# Patient Record
Sex: FEMALE | Race: WHITE | NOT HISPANIC OR LATINO | URBAN - METROPOLITAN AREA
[De-identification: names, ages, dates, MRNs, and addresses within clinical notes are randomized per-mention and may not be internally consistent; named-entity substitution may affect disease eponyms.]

---

## 2017-03-19 ENCOUNTER — OFFICE VISIT - HEALTHEAST (OUTPATIENT)
Dept: FAMILY MEDICINE | Facility: CLINIC | Age: 32
End: 2017-03-19

## 2017-03-19 DIAGNOSIS — J32.9 SINUSITIS: ICD-10-CM

## 2017-03-19 DIAGNOSIS — J02.0 STREP PHARYNGITIS: ICD-10-CM

## 2017-03-19 DIAGNOSIS — R07.0 THROAT PAIN: ICD-10-CM

## 2017-06-08 ENCOUNTER — RECORDS - HEALTHEAST (OUTPATIENT)
Dept: ADMINISTRATIVE | Facility: OTHER | Age: 32
End: 2017-06-08

## 2017-06-08 LAB
BKR LAB AP ABNORMAL BLEEDING: NO
BKR LAB AP BIRTH CONTROL/HORMONES: NORMAL
BKR LAB AP CERVICAL APPEARANCE: NORMAL
BKR LAB AP GYN ADEQUACY: NORMAL
BKR LAB AP GYN INTERPRETATION: NORMAL
BKR LAB AP HPV REFLEX: NORMAL
BKR LAB AP LMP: NORMAL
BKR LAB AP PATIENT STATUS: NORMAL
BKR LAB AP PREVIOUS ABNORMAL: 2009
BKR LAB AP PREVIOUS NORMAL: NORMAL
HIGH RISK?: NO
HPV INTERPRETATION - HISTORICAL: NORMAL
HPV INTERPRETER - HISTORICAL: NORMAL
PATH REPORT.COMMENTS IMP SPEC: NORMAL
RESULT FLAG (HE HISTORICAL CONVERSION): NORMAL

## 2018-07-30 ENCOUNTER — PRENATAL OFFICE VISIT - HEALTHEAST (OUTPATIENT)
Dept: MIDWIFE SERVICES | Facility: CLINIC | Age: 33
End: 2018-07-30

## 2018-07-30 DIAGNOSIS — O09.91 SUPERVISION OF HIGH RISK PREGNANCY IN FIRST TRIMESTER: ICD-10-CM

## 2018-07-30 DIAGNOSIS — Z87.59 HISTORY OF POSTPARTUM HEMORRHAGE: ICD-10-CM

## 2018-07-30 DIAGNOSIS — O34.219 PREVIOUS CESAREAN DELIVERY, ANTEPARTUM CONDITION OR COMPLICATION: ICD-10-CM

## 2018-07-30 LAB
BASOPHILS # BLD AUTO: 0 THOU/UL (ref 0–0.2)
BASOPHILS NFR BLD AUTO: 1 % (ref 0–2)
EOSINOPHIL # BLD AUTO: 0.1 THOU/UL (ref 0–0.4)
EOSINOPHIL NFR BLD AUTO: 1 % (ref 0–6)
ERYTHROCYTE [DISTWIDTH] IN BLOOD BY AUTOMATED COUNT: 12.7 % (ref 11–14.5)
HCT VFR BLD AUTO: 39.8 % (ref 35–47)
HGB BLD-MCNC: 13.6 G/DL (ref 12–16)
HIV 1+2 AB+HIV1 P24 AG SERPL QL IA: NEGATIVE
LYMPHOCYTES # BLD AUTO: 1.6 THOU/UL (ref 0.8–4.4)
LYMPHOCYTES NFR BLD AUTO: 25 % (ref 20–40)
MCH RBC QN AUTO: 29.5 PG (ref 27–34)
MCHC RBC AUTO-ENTMCNC: 34.2 G/DL (ref 32–36)
MCV RBC AUTO: 86 FL (ref 80–100)
MONOCYTES # BLD AUTO: 0.4 THOU/UL (ref 0–0.9)
MONOCYTES NFR BLD AUTO: 6 % (ref 2–10)
NEUTROPHILS # BLD AUTO: 4.4 THOU/UL (ref 2–7.7)
NEUTROPHILS NFR BLD AUTO: 67 % (ref 50–70)
PLATELET # BLD AUTO: 195 THOU/UL (ref 140–440)
PMV BLD AUTO: 11.1 FL (ref 8.5–12.5)
RBC # BLD AUTO: 4.61 MILL/UL (ref 3.8–5.4)
WBC: 6.5 THOU/UL (ref 4–11)

## 2018-07-30 ASSESSMENT — MIFFLIN-ST. JEOR: SCORE: 1413.69

## 2018-07-31 LAB
ABO/RH(D): NORMAL
ABORH REPEAT: NORMAL
ANTIBODY SCREEN: NEGATIVE
BACTERIA SPEC CULT: NO GROWTH
HBV SURFACE AG SERPL QL IA: NEGATIVE
RUBV IGG SERPL QL IA: POSITIVE
T PALLIDUM AB SER QL: NEGATIVE

## 2018-08-27 ENCOUNTER — PRENATAL OFFICE VISIT - HEALTHEAST (OUTPATIENT)
Dept: MIDWIFE SERVICES | Facility: CLINIC | Age: 33
End: 2018-08-27

## 2018-08-27 DIAGNOSIS — O09.91 SUPERVISION OF HIGH RISK PREGNANCY IN FIRST TRIMESTER: ICD-10-CM

## 2018-08-27 ASSESSMENT — MIFFLIN-ST. JEOR: SCORE: 1418.23

## 2018-09-24 ENCOUNTER — PRENATAL OFFICE VISIT - HEALTHEAST (OUTPATIENT)
Dept: MIDWIFE SERVICES | Facility: CLINIC | Age: 33
End: 2018-09-24

## 2018-09-24 DIAGNOSIS — O09.92 SUPERVISION OF HIGH RISK PREGNANCY IN SECOND TRIMESTER: ICD-10-CM

## 2018-09-24 DIAGNOSIS — O34.219 PREVIOUS CESAREAN DELIVERY, ANTEPARTUM CONDITION OR COMPLICATION: ICD-10-CM

## 2018-09-24 ASSESSMENT — MIFFLIN-ST. JEOR: SCORE: 1428.21

## 2018-10-15 ENCOUNTER — HOSPITAL ENCOUNTER (OUTPATIENT)
Dept: ULTRASOUND IMAGING | Facility: CLINIC | Age: 33
Discharge: HOME | End: 2018-10-15
Attending: MIDWIFE

## 2018-10-15 ENCOUNTER — AMBULATORY - HEALTHEAST (OUTPATIENT)
Dept: MIDWIFE SERVICES | Facility: CLINIC | Age: 33
End: 2018-10-15

## 2018-10-15 DIAGNOSIS — O09.92 SUPERVISION OF HIGH RISK PREGNANCY IN SECOND TRIMESTER: ICD-10-CM

## 2018-10-18 ENCOUNTER — COMMUNICATION - HEALTHEAST (OUTPATIENT)
Dept: ADMINISTRATIVE | Facility: CLINIC | Age: 33
End: 2018-10-18

## 2018-10-18 DIAGNOSIS — O28.3 ECHOGENIC INTRACARDIAC FOCUS OF FETUS ON PRENATAL ULTRASOUND: ICD-10-CM

## 2018-10-22 ENCOUNTER — PRENATAL OFFICE VISIT - HEALTHEAST (OUTPATIENT)
Dept: MIDWIFE SERVICES | Facility: CLINIC | Age: 33
End: 2018-10-22

## 2018-10-22 DIAGNOSIS — O09.92 SUPERVISION OF HIGH RISK PREGNANCY IN SECOND TRIMESTER: ICD-10-CM

## 2018-10-22 DIAGNOSIS — O34.219 PREVIOUS CESAREAN DELIVERY, ANTEPARTUM CONDITION OR COMPLICATION: ICD-10-CM

## 2018-10-22 ASSESSMENT — MIFFLIN-ST. JEOR: SCORE: 1461.77

## 2018-12-05 ENCOUNTER — PRENATAL OFFICE VISIT - HEALTHEAST (OUTPATIENT)
Dept: MIDWIFE SERVICES | Facility: CLINIC | Age: 33
End: 2018-12-05

## 2018-12-05 ENCOUNTER — COMMUNICATION - HEALTHEAST (OUTPATIENT)
Dept: MIDWIFE SERVICES | Facility: CLINIC | Age: 33
End: 2018-12-05

## 2018-12-05 DIAGNOSIS — O34.219 PREVIOUS CESAREAN DELIVERY, ANTEPARTUM CONDITION OR COMPLICATION: ICD-10-CM

## 2018-12-05 DIAGNOSIS — O09.629 SUPERVISION OF HIGH-RISK PREGNANCY OF YOUNG MULTIGRAVIDA: ICD-10-CM

## 2018-12-05 LAB
FASTING STATUS PATIENT QL REPORTED: NO
GLUCOSE 1H P 50 G GLC PO SERPL-MCNC: 112 MG/DL (ref 70–139)
HGB BLD-MCNC: 11.3 G/DL (ref 12–16)

## 2018-12-05 ASSESSMENT — MIFFLIN-ST. JEOR: SCORE: 1491.26

## 2018-12-06 LAB — T PALLIDUM AB SER QL: NEGATIVE

## 2018-12-13 ENCOUNTER — OFFICE VISIT - HEALTHEAST (OUTPATIENT)
Dept: OBGYN | Facility: CLINIC | Age: 33
End: 2018-12-13

## 2018-12-13 DIAGNOSIS — O34.219 H/O CESAREAN SECTION COMPLICATING PREGNANCY: ICD-10-CM

## 2018-12-13 ASSESSMENT — MIFFLIN-ST. JEOR: SCORE: 1490.8

## 2019-01-02 ENCOUNTER — PRENATAL OFFICE VISIT - HEALTHEAST (OUTPATIENT)
Dept: MIDWIFE SERVICES | Facility: CLINIC | Age: 34
End: 2019-01-02

## 2019-01-02 DIAGNOSIS — Z87.59 HISTORY OF POSTPARTUM HEMORRHAGE: ICD-10-CM

## 2019-01-02 DIAGNOSIS — O34.219 PREVIOUS CESAREAN DELIVERY, ANTEPARTUM CONDITION OR COMPLICATION: ICD-10-CM

## 2019-01-02 DIAGNOSIS — O09.92 SUPERVISION OF HIGH RISK PREGNANCY IN SECOND TRIMESTER: ICD-10-CM

## 2019-01-02 ASSESSMENT — MIFFLIN-ST. JEOR: SCORE: 1522.1

## 2019-01-16 ENCOUNTER — PRENATAL OFFICE VISIT - HEALTHEAST (OUTPATIENT)
Dept: MIDWIFE SERVICES | Facility: CLINIC | Age: 34
End: 2019-01-16

## 2019-01-16 DIAGNOSIS — O34.219 PREVIOUS CESAREAN DELIVERY, ANTEPARTUM CONDITION OR COMPLICATION: ICD-10-CM

## 2019-01-16 DIAGNOSIS — O09.92 SUPERVISION OF HIGH RISK PREGNANCY IN SECOND TRIMESTER: ICD-10-CM

## 2019-01-16 ASSESSMENT — MIFFLIN-ST. JEOR: SCORE: 1513.48

## 2019-01-31 ENCOUNTER — PRENATAL OFFICE VISIT - HEALTHEAST (OUTPATIENT)
Dept: MIDWIFE SERVICES | Facility: CLINIC | Age: 34
End: 2019-01-31

## 2019-01-31 DIAGNOSIS — O09.92 SUPERVISION OF HIGH RISK PREGNANCY IN SECOND TRIMESTER: ICD-10-CM

## 2019-01-31 LAB — HGB BLD-MCNC: 12.5 G/DL (ref 12–16)

## 2019-01-31 ASSESSMENT — MIFFLIN-ST. JEOR: SCORE: 1547.96

## 2019-02-01 LAB
ALLERGIC TO PENICILLIN: NO
GP B STREP DNA SPEC QL NAA+PROBE: NEGATIVE

## 2019-02-06 ENCOUNTER — PRENATAL OFFICE VISIT - HEALTHEAST (OUTPATIENT)
Dept: MIDWIFE SERVICES | Facility: CLINIC | Age: 34
End: 2019-02-06

## 2019-02-06 DIAGNOSIS — O09.92 SUPERVISION OF HIGH RISK PREGNANCY IN SECOND TRIMESTER: ICD-10-CM

## 2019-02-06 DIAGNOSIS — O34.219 PREVIOUS CESAREAN DELIVERY, ANTEPARTUM CONDITION OR COMPLICATION: ICD-10-CM

## 2019-02-06 ASSESSMENT — MIFFLIN-ST. JEOR: SCORE: 1542.06

## 2019-02-13 ENCOUNTER — COMMUNICATION - HEALTHEAST (OUTPATIENT)
Dept: MIDWIFE SERVICES | Facility: CLINIC | Age: 34
End: 2019-02-13

## 2019-02-13 ENCOUNTER — PRENATAL OFFICE VISIT - HEALTHEAST (OUTPATIENT)
Dept: MIDWIFE SERVICES | Facility: CLINIC | Age: 34
End: 2019-02-13

## 2019-02-13 DIAGNOSIS — O32.2XX0 TRANSVERSE LIE OF FETUS, SINGLE OR UNSPECIFIED FETUS: ICD-10-CM

## 2019-02-13 ASSESSMENT — MIFFLIN-ST. JEOR: SCORE: 1541.15

## 2019-02-18 ENCOUNTER — HOSPITAL ENCOUNTER (OUTPATIENT)
Dept: ULTRASOUND IMAGING | Facility: CLINIC | Age: 34
Discharge: HOME | End: 2019-02-18
Attending: ADVANCED PRACTICE MIDWIFE

## 2019-02-18 ENCOUNTER — AMBULATORY - HEALTHEAST (OUTPATIENT)
Dept: OBGYN | Facility: CLINIC | Age: 34
End: 2019-02-18

## 2019-02-18 DIAGNOSIS — O32.2XX0 TRANSVERSE LIE OF FETUS, SINGLE OR UNSPECIFIED FETUS: ICD-10-CM

## 2019-02-20 ENCOUNTER — PRENATAL OFFICE VISIT - HEALTHEAST (OUTPATIENT)
Dept: MIDWIFE SERVICES | Facility: CLINIC | Age: 34
End: 2019-02-20

## 2019-02-20 ENCOUNTER — COMMUNICATION - HEALTHEAST (OUTPATIENT)
Dept: MIDWIFE SERVICES | Facility: CLINIC | Age: 34
End: 2019-02-20

## 2019-02-20 DIAGNOSIS — O09.92 SUPERVISION OF HIGH RISK PREGNANCY IN SECOND TRIMESTER: ICD-10-CM

## 2019-02-20 DIAGNOSIS — O34.219 PREVIOUS CESAREAN DELIVERY, ANTEPARTUM CONDITION OR COMPLICATION: ICD-10-CM

## 2019-02-20 ASSESSMENT — MIFFLIN-ST. JEOR: SCORE: 1555.22

## 2019-02-25 ENCOUNTER — SURGERY - HEALTHEAST (OUTPATIENT)
Dept: OBGYN | Facility: CLINIC | Age: 34
End: 2019-02-25

## 2019-02-25 ENCOUNTER — ANESTHESIA - HEALTHEAST (OUTPATIENT)
Dept: OBGYN | Facility: CLINIC | Age: 34
End: 2019-02-25

## 2019-02-25 ENCOUNTER — COMMUNICATION - HEALTHEAST (OUTPATIENT)
Dept: MIDWIFE SERVICES | Facility: CLINIC | Age: 34
End: 2019-02-25

## 2019-02-25 ASSESSMENT — MIFFLIN-ST. JEOR: SCORE: 1554.31

## 2019-02-28 ENCOUNTER — HOME CARE/HOSPICE - HEALTHEAST (OUTPATIENT)
Dept: HOME HEALTH SERVICES | Facility: HOME HEALTH | Age: 34
End: 2019-02-28

## 2019-03-01 ENCOUNTER — HOME CARE/HOSPICE - HEALTHEAST (OUTPATIENT)
Dept: HOME HEALTH SERVICES | Facility: HOME HEALTH | Age: 34
End: 2019-03-01

## 2019-04-04 ENCOUNTER — OFFICE VISIT - HEALTHEAST (OUTPATIENT)
Dept: MIDWIFE SERVICES | Facility: CLINIC | Age: 34
End: 2019-04-04

## 2019-04-04 DIAGNOSIS — Z87.59 HISTORY OF POSTPARTUM HEMORRHAGE: ICD-10-CM

## 2019-04-04 DIAGNOSIS — Z98.891 STATUS POST REPEAT LOW TRANSVERSE CESAREAN SECTION: ICD-10-CM

## 2019-04-04 DIAGNOSIS — N81.89 PELVIC FLOOR RELAXATION: ICD-10-CM

## 2019-04-04 LAB — HGB BLD-MCNC: 12.9 G/DL (ref 12–16)

## 2019-04-04 ASSESSMENT — MIFFLIN-ST. JEOR: SCORE: 1431.84

## 2019-04-05 LAB
25(OH)D3 SERPL-MCNC: 60.1 NG/ML (ref 30–80)
25(OH)D3 SERPL-MCNC: 60.1 NG/ML (ref 30–80)

## 2019-04-11 ENCOUNTER — AMBULATORY - HEALTHEAST (OUTPATIENT)
Dept: MIDWIFE SERVICES | Facility: CLINIC | Age: 34
End: 2019-04-11

## 2019-04-15 ENCOUNTER — COMMUNICATION - HEALTHEAST (OUTPATIENT)
Dept: MIDWIFE SERVICES | Facility: CLINIC | Age: 34
End: 2019-04-15

## 2019-04-17 ENCOUNTER — OFFICE VISIT - HEALTHEAST (OUTPATIENT)
Dept: PHYSICAL THERAPY | Facility: REHABILITATION | Age: 34
End: 2019-04-17

## 2019-04-17 DIAGNOSIS — R39.15 URINARY URGENCY: ICD-10-CM

## 2019-04-17 DIAGNOSIS — N81.89 PELVIC FLOOR WEAKNESS: ICD-10-CM

## 2019-04-17 DIAGNOSIS — N81.89 PELVIC FLOOR RELAXATION: ICD-10-CM

## 2019-05-10 ENCOUNTER — OFFICE VISIT - HEALTHEAST (OUTPATIENT)
Dept: PHYSICAL THERAPY | Facility: REHABILITATION | Age: 34
End: 2019-05-10

## 2019-05-10 DIAGNOSIS — N81.89 PELVIC FLOOR WEAKNESS: ICD-10-CM

## 2019-05-10 DIAGNOSIS — R39.15 URINARY URGENCY: ICD-10-CM

## 2019-05-10 DIAGNOSIS — N81.89 PELVIC FLOOR RELAXATION: ICD-10-CM

## 2020-01-10 ENCOUNTER — COMMUNICATION - HEALTHEAST (OUTPATIENT)
Dept: SCHEDULING | Facility: CLINIC | Age: 35
End: 2020-01-10

## 2020-01-11 ENCOUNTER — OFFICE VISIT - HEALTHEAST (OUTPATIENT)
Dept: FAMILY MEDICINE | Facility: CLINIC | Age: 35
End: 2020-01-11

## 2020-01-11 DIAGNOSIS — R11.0 NAUSEA: ICD-10-CM

## 2020-01-11 DIAGNOSIS — K62.5 RECTAL BLEEDING: ICD-10-CM

## 2020-01-11 DIAGNOSIS — R00.1 BRADYCARDIA: ICD-10-CM

## 2020-01-11 DIAGNOSIS — R42 DIZZINESS: ICD-10-CM

## 2020-01-11 DIAGNOSIS — Z71.1: ICD-10-CM

## 2020-01-11 LAB
ALBUMIN UR-MCNC: NEGATIVE MG/DL
ANION GAP SERPL CALCULATED.3IONS-SCNC: 8 MMOL/L (ref 5–18)
APPEARANCE UR: CLEAR
ATRIAL RATE - MUSE: 52 BPM
BASOPHILS # BLD AUTO: 0 THOU/UL (ref 0–0.2)
BASOPHILS NFR BLD AUTO: 0 % (ref 0–2)
BILIRUB UR QL STRIP: NEGATIVE
BUN SERPL-MCNC: 21 MG/DL (ref 8–22)
CHLORIDE BLD-SCNC: 103 MMOL/L (ref 98–107)
CO2 SERPL-SCNC: 26 MMOL/L (ref 22–31)
COLOR UR AUTO: YELLOW
CREAT SERPL-MCNC: 0.6 MG/DL (ref 0.7–1.3)
DEPRECATED S PYO AG THROAT QL EIA: NORMAL
DIASTOLIC BLOOD PRESSURE - MUSE: NORMAL
EOSINOPHIL # BLD AUTO: 0.1 THOU/UL (ref 0–0.4)
EOSINOPHIL NFR BLD AUTO: 2 % (ref 0–6)
ERYTHROCYTE [DISTWIDTH] IN BLOOD BY AUTOMATED COUNT: 11.8 % (ref 11–14.5)
FLUAV AG SPEC QL IA: NORMAL
FLUBV AG SPEC QL IA: NORMAL
GLUCOSE BLD-MCNC: 96 MG/DL (ref 70–125)
GLUCOSE UR STRIP-MCNC: NEGATIVE MG/DL
HCG UR QL: NEGATIVE
HCT VFR BLD AUTO: 36.9 % (ref 35–47)
HEMOCCULT SP1 STL QL: POSITIVE
HGB BLD-MCNC: 12 G/DL (ref 12–16)
HGB UR QL STRIP: NEGATIVE
INTERPRETATION ECG - MUSE: NORMAL
KETONES UR STRIP-MCNC: NEGATIVE MG/DL
LEUKOCYTE ESTERASE UR QL STRIP: NEGATIVE
LYMPHOCYTES # BLD AUTO: 1.7 THOU/UL (ref 0.8–4.4)
LYMPHOCYTES NFR BLD AUTO: 33 % (ref 20–40)
MCH RBC QN AUTO: 28.8 PG (ref 27–34)
MCHC RBC AUTO-ENTMCNC: 32.6 G/DL (ref 32–36)
MCV RBC AUTO: 88 FL (ref 80–100)
MONOCYTES # BLD AUTO: 0.3 THOU/UL (ref 0–0.9)
MONOCYTES NFR BLD AUTO: 7 % (ref 2–10)
NEUTROPHILS # BLD AUTO: 3 THOU/UL (ref 2–7.7)
NEUTROPHILS NFR BLD AUTO: 58 % (ref 50–70)
NITRATE UR QL: NEGATIVE
P AXIS - MUSE: 41 DEGREES
PH UR STRIP: 6.5 [PH] (ref 5–8)
PLATELET # BLD AUTO: 204 THOU/UL (ref 140–440)
PMV BLD AUTO: 7.6 FL (ref 7–10)
POTASSIUM BLD-SCNC: 4.2 MMOL/L (ref 3.5–5.5)
PR INTERVAL - MUSE: 164 MS
QRS DURATION - MUSE: 92 MS
QT - MUSE: 416 MS
QTC - MUSE: 386 MS
R AXIS - MUSE: 75 DEGREES
RBC # BLD AUTO: 4.18 MILL/UL (ref 3.8–5.4)
SODIUM SERPL-SCNC: 137 MMOL/L (ref 136–145)
SP GR UR STRIP: 1.02 (ref 1–1.03)
SYSTOLIC BLOOD PRESSURE - MUSE: NORMAL
T AXIS - MUSE: 19 DEGREES
UROBILINOGEN UR STRIP-ACNC: NORMAL
VENTRICULAR RATE- MUSE: 52 BPM
WBC: 5.1 THOU/UL (ref 4–11)

## 2020-01-12 LAB — GROUP A STREP BY PCR: NORMAL

## 2020-01-13 ENCOUNTER — OFFICE VISIT - HEALTHEAST (OUTPATIENT)
Dept: FAMILY MEDICINE | Facility: CLINIC | Age: 35
End: 2020-01-13

## 2020-01-13 DIAGNOSIS — L65.9 HAIR LOSS: ICD-10-CM

## 2020-01-13 DIAGNOSIS — F41.1 GENERALIZED ANXIETY DISORDER: ICD-10-CM

## 2020-01-13 DIAGNOSIS — G43.709 CHRONIC MIGRAINE WITHOUT AURA WITHOUT STATUS MIGRAINOSUS, NOT INTRACTABLE: ICD-10-CM

## 2020-01-13 DIAGNOSIS — Z13.220 SCREENING, LIPID: ICD-10-CM

## 2020-01-13 LAB
ALBUMIN SERPL-MCNC: 4.4 G/DL (ref 3.5–5)
ALP SERPL-CCNC: 54 U/L (ref 45–120)
ALT SERPL W P-5'-P-CCNC: 14 U/L (ref 0–45)
ANION GAP SERPL CALCULATED.3IONS-SCNC: 7 MMOL/L (ref 5–18)
AST SERPL W P-5'-P-CCNC: 19 U/L (ref 0–40)
BILIRUB SERPL-MCNC: 0.7 MG/DL (ref 0–1)
BUN SERPL-MCNC: 13 MG/DL (ref 8–22)
CALCIUM SERPL-MCNC: 9.8 MG/DL (ref 8.5–10.5)
CHLORIDE BLD-SCNC: 104 MMOL/L (ref 98–107)
CHOLEST SERPL-MCNC: 219 MG/DL
CO2 SERPL-SCNC: 29 MMOL/L (ref 22–31)
CREAT SERPL-MCNC: 0.79 MG/DL (ref 0.6–1.1)
FASTING STATUS PATIENT QL REPORTED: YES
GFR SERPL CREATININE-BSD FRML MDRD: >60 ML/MIN/1.73M2
GLUCOSE BLD-MCNC: 86 MG/DL (ref 70–125)
HDLC SERPL-MCNC: 73 MG/DL
LDLC SERPL CALC-MCNC: 135 MG/DL
POTASSIUM BLD-SCNC: 4.1 MMOL/L (ref 3.5–5)
PROT SERPL-MCNC: 7.4 G/DL (ref 6–8)
SODIUM SERPL-SCNC: 140 MMOL/L (ref 136–145)
TRIGL SERPL-MCNC: 57 MG/DL
TSH SERPL DL<=0.005 MIU/L-ACNC: 1.18 UIU/ML (ref 0.3–5)

## 2020-01-13 ASSESSMENT — MIFFLIN-ST. JEOR: SCORE: 1430.48

## 2020-01-14 ENCOUNTER — COMMUNICATION - HEALTHEAST (OUTPATIENT)
Dept: FAMILY MEDICINE | Facility: CLINIC | Age: 35
End: 2020-01-14

## 2021-05-27 NOTE — PROGRESS NOTES
THELMA Antunez,KIERRA performed first assist services for Christen's  section 19 with Dr. Oconnell.

## 2021-05-27 NOTE — PROGRESS NOTES
[facility]   Initial Evaluation    Patient Name: Christen Chappell  Date of evaluation: 2019  Referral Diagnosis: pelvic floor prolapse and urinary urgency  Referring provider: Yoly Aguilera*  Visit Diagnosis:     ICD-10-CM    1. Pelvic floor relaxation N81.89    2. Urinary urgency R39.15    3. Pelvic floor weakness N81.89        Assessment:      Pt. is appropriate for skilled PT intervention as outlined in the Plan of Care (POC).  Pt. is a good candidate for skilled PT services to improve pain levels and function.    Goals:  Pt. will demonstrate/verbalize independence in self-management of condition in : 4 weeks    Pt will: no longer experience urinary urgency in 4 weeks  Pt will: be able to perform abdominal strengthening exercises without experiencing a feeling of prolapse in 4 weeks      Patient's expectations/goals are realistic    Barriers to Learning or Achieving Goals:  No Barriers.    Goals and plan of care were set up in collaboration with the patient.       Plan / Patient Instructions:        Plan of Care:   Communication with: Referral Source  Patient Related Instruction: Nature of Condition;Precautions;Next steps;Treatment plan and rationale;Expected outcome;Self Care instruction;Basis of treatment  Times per Week: 1  Number of Weeks: 2-4  Number of Visits: 4  Discharge Planning: to include self management  Precautions / Restrictions : none  Therapeutic Exercise: ROM;Stretching;Strengthening;Pelvic Floor retraining  Manual Therapy: soft tissue mobilization;myofascial release      Plan for next visit: continue with abdominal strengthening and pelvic floor strengthening.     Subjective:         History of Present Illness:    hCristen is a 33 y.o. female who presents to therapy today with complaints of prolapse.   Date of onset/duration of symptoms is 2019 after having her third child/second .   Did have some incontinence 2 weeks after her  but that has sense  resolved.  Denies having any stress incontinence.  Describes having urinary urgency.   Occurs daily.  Doesn't have any leakage with this.   Is not aware of any symptoms of her prolapse but does describe a feeling of movement in the pelvis when coming to a sitting position from lying down.   She describes previous level of function as not limited   Going back to work May 13th.       Pain Ratin}    Functional limitations are described as occurring with:   urinary urgency and feelings of prolapse    Patient presents with pelvic floor weakness, prolapse, and symptoms of urinary urgency.  Would benefit from PT to increase strength and manage urinary urgency.     Objective:        Examination    Patient was educated on what the exam today would consist of.  Consent was obtained for performing an external and internal vaginal exam.    1. Pelvic floor relaxation     2. Urinary urgency     3. Pelvic floor weakness       Precautions/Restrictions: None  Involved side: Bilateral  Posture Observation:      General sitting posture is  normal.  General standing posture is normal.    Observation:     Breathing -  Chest breather.  Able to correct with cues   Vaginal observation:     Kegel - slight movement   Bearing down - no movement observed   Cough - bulging out observed         Palpation:   External - Not done    Pubic symphasis     Perineal body    Superficial transverse perineal muscle     Pubococcygeus    Iliococcygeus    Coccyx     Bulbocavernosus    Ischiocavernosus     Internal    Kegel 1st layer - minimal contraction    Kegel 2nd layer - moderate contraction    Kegel 3rd layer - moderate contraction      PERF test:        P - 2    E - 4    R - 2    F - 7      Obturator Internus - not palpated      Anterior Wall prolapse - stage 1-2 noted    Posterior Wall prolapse          Treatment Today     TREATMENT MINUTES COMMENTS   Evaluation 30 Low complexity   Self-care/ Home management     Manual therapy     Neuromuscular  Re-education     Therapeutic Activity     Therapeutic Exercises 25 Exercises:  Exercise #1: diaphragmatice breathing to be used during urgency episode  Exercise #2: Kegel 3 quick contactions and 3 gradual contractions 3-5 times 3-5 tiems a day    Discussed the mechanics of urinary urgency and ways to use Kegels and diaphragmatic breathing to manage.     Gait training     Modality__________________                Total 55    Blank areas are intentional and mean the treatment did not include these items.     PT Evaluation Code: (Please list factors)  Patient History/Comorbidities: none  Examination: pelvic floor weakness, pelvic floor prolapse, urinary urgency  Clinical Presentation: stable  Clinical Decision Making: low    Patient History/  Comorbidities Examination  (body structures and functions, activity limitations, and/or participation restrictions) Clinical Presentation Clinical Decision Making (Complexity)   No documented Comorbidities or personal factors 1-2 Elements Stable and/or uncomplicated Low   1-2 documented comorbidities or personal factor 3 Elements Evolving clinical presentation with changing characteristics Moderate   3-4 documented comorbidities or personal factors 4 or more Unstable and unpredictable High              Loraine Barber, PT  4/17/2019  2:04 PM

## 2021-05-27 NOTE — PATIENT INSTRUCTIONS - HE
For constipation try Magnesium Oxide 350mg daily, probiotics (with bifudus), increase intake of fibrous foods (you can try smoothies or fiber supplements), water intake and exercise (try to exercise more days out of the week than not).   If you haven't had a bowel movement in 3 days you can use Milk of Magnesia, Doculax, or Lactulose sparingly

## 2021-05-27 NOTE — PROGRESS NOTES
"6-week Postpartum Visit:     Assessment:   32yo  at 6 weeks postpartum   Repeat  29 for transverse fetal lie  Breastfeeding mother  Contraceptive Counseling  EDPS = 5, \"Never\" to #10  Last pap = 17 Neg, NIL, due 2022   Poor pelvic floor tone    Plan:   1. Adjustment to parenting, self care and importance of a support system discussed. Postpartum education given including: postpartum mood changes and postpartum depression. [place]  Mental Health Resource Card given for future reference prn.   2. Return of fertility discussed. Plans NFP (withdrawal and condoms as well) for contraception. Education given on this method. Resumption of intercourse reviewed with possible changes in libido and vaginal lubrication while nursing.  -Strongly encouraged Emily to consider a more effective form of birth control considering her recent  section encouraged her to use a highly effective method until her baby is at least 18 months old  3. Discussed resumption of exercise and normal timing of return to pre-pregnancy weight. Postpartum physical activity reviewed and encouraged modified abdominal crunches and Kegels daily. Encouraged integrating exercise, such as walking 20-30mns daily.   4.  Nutrition and supplements reviewed.  Advised continuation of a prenatal or multivitamin, also Vitamin D3 2000 IU geltab daily and an omega 3 fatty acid supplement.  5. Reviewed warning signs of pelvic pain, excessive bleeding or abdominal pain, fever/chills, or signs of breast infection.   6. Breastfeeding support resource list and contact info given, including [facility], [facility], local LLL groups, and new moms groups. Warning signs of breast infections (mastitis and thrush) reviewed.  7. Poor pelvic floor tone: referred to [facility] Amaro for evaluation and treatment.  If she is not in network Christen will choose another pelvic floor physical therapist  8. " "Offered emotional support around her disappointment that she will not be able to have another vaginal birth and would need to deliver early to avoid labor on her uterus.    -Labs today:     Hgb d/t hx of postpartum hemorrhage  Vitamin D.  Christen is currently taking 4000 IUs of vitamin D daily    -RTC for routine health maintenance or sooner as needed.     TT with patient 40 mns, >50% time spent in counseling or coordination of care.     Subjective:    Christen Chappell is a 33 y.o. female who presents for postpartum visit. She is 6 weeks postpartum following a repeat LTCS for transverse lie.  I have fully reviewed the prenatal and intrapartum course. The delivery was at Term and 39/4 weeks gestation Her baby girl is named Rosangela and weighed 8 lbs 2.9 oz at birth. Reflections on her birth include \"it was worse than natural labor\" She struggled during the  section with nausea, pain underneath her right ribs and anxiety.  She keeps asking herself if there is something she could have done to help her baby line up better before labor.   Christen gave birth to her son Rod by   due to fetal distress. She had a  of [place]        Postpartum course has been complicated by difficulty with getting enough rest, needing to ask for a lot of help. Baby's course has been stable. Baby is breastfeeding on demand. Lochia ceased at 4 weeks postpartum.  Bowel function is normal. Bladder function is normal. Hemorrhoids have improved. She has not resumed intercourse. Desired contraception: NFP, withdrawal and condoms during the fertile periods. Appetite is normal. Reports sleeping 6-7. Bruce  Depression Scale postpartum depression screening score: 5.   Rosangela has hip dysplasia, going to [facility] for care.  She has not resumed regular exercise. Patient returns to work in 4 weeks. Full time as an   Saw Dr. Spears at two weeks who advised that she not have a vaginal birth again or labor " "on her uterus.  If she decides to have another child Dr. Oconnell recommended a  section at 36-37 weeks due to her vertical incision.  Taking one iron supplement, will repeat hemoglobin today      Review of Systems  -A 12 point comprehensive review of systems was negative except as noted above.  -Prenatal history and intrapartum course were also reviewed today.  -Social, Medical and family history reviewed    Objective:      Vitals:    19 1054   BP: 100/66   Pulse: 72   Weight: 157 lb (71.2 kg)   Height: 5' 6.5\" (1.689 m)       Physical Exam:  General Appearance: Alert, cooperative, no distress, appears stated age  Head: Normocephalic, without obvious abnormality, atraumatic  Eyes: Conjunctiva/corneas clear, does not wear corrective lenses  Neck: Supple, symmetrical, trachea midline, no adenopathy  Thyroid: not enlarged, symmetric, no tenderness/mass/nodules  Back: Symmetric, no curvature, ROM normal, no CVA tenderness  Lungs: Clear to auscultation bilaterally, respirations unlabored  Heart: Regular rate and rhythm, S1 and S2 normal, no murmur, rub, or gallop  Breasts: Engorgement resolved/Lactating.  Nipples intact with no cracking.  Abdomen: Soft, non-tender, no masses. Incision well healed  Diastasis  1 fb.  Pelvic: External genitalia normal without lesions or irritation. Vagina and cervix show no lesions, inflammation, discharge or tenderness.. Uterus fully involuted.  No adnexal mass or tenderness. Pap smear not collected. Poor pelvic floor muscle tone.   Extremities: Extremities normal, atraumatic, no cyanosis or edema  Skin: Skin color, texture, turgor normal, no rashes or lesions  Lymph nodes: Cervical, supraclavicular, and axillary nodes normal  Neurologic: Alert and oriented x 3.      Last Pap: 17 . Results were: Neg/NIL  Immunization History   Administered Date(s) Administered     HPV Quadrivalent 2008, 2008, 2008     Influenza, inj, historic,unspecified 10/01/2014, " 11/02/2016     Influenza,seasonal quad, PF, 36+MOS 09/24/2018     Td,adult,historic,unspecified 10/20/2014     Tdap 05/19/2008, 09/22/2014, 11/02/2016, 12/05/2018     Immunization status: up to date and documented

## 2021-05-28 NOTE — PROGRESS NOTES
[facility] Daily Progress     Patient Name: Christen Chappell  Date: 5/10/2019  Visit #: 2  PTA visit #:   Referral Diagnosis: pelvic floor weakness  Referring provider: Yoly Aguilera*  Visit Diagnosis:     ICD-10-CM    1. Pelvic floor relaxation N81.89    2. Urinary urgency R39.15    3. Pelvic floor weakness N81.89          Assessment:     HEP/POC compliance is  good .  Patient demonstrates understanding/independence with home program.  Patient is benefitting from skilled physical therapy and is making steady progress toward functional goals.  Patient is appropriate to continue with skilled physical therapy intervention, as indicated by initial plan of care.    Goal Status:  Pt. will demonstrate/verbalize independence in self-management of condition in : 4 weeks    Pt will: no longer experience urinary urgency in 4 weeks  Pt will: be able to perform abdominal strengthening exercises without experiencing a feeling of prolapse in 4 weeks    Less episodes of urgency.  Did have one episode of leaking when bending forward with a full bladder.    Plan / Patient Education:     Continue with plan of care.    Add Knack technique for increased coordination of pelvic floor and abdominals.      Subjective:     Pain Ratin     Urgency episodes have decreased significantly.    Bent forward with a full bladder and felt leakage yesterday.    No problems with the exercises.        Objective:       Treatment Today    TREATMENT MINUTES COMMENTS   Evaluation     Self-care/ Home management     Manual therapy     Neuromuscular Re-education     Therapeutic Activity     Therapeutic Exercises 25 Exercises:  Exercise #1: diaphragmatice breathing to be used during urgency episode  Exercise #2: Kegel 3 quick contactions and 3 gradual contractions 3-5 times 3-5 tiems a day  Exercise #3: sit to stand with pelvic floor tightening.      Reviewed exercises.  Added sit to stand exercise to improve pelvic floor and  abdominal strength coordination.      Performed abdominal crunchs and leg lifts with instruction to engage pelvic floor and abdominals together.       Gait training     Modality__________________                Total 25    Blank areas are intentional and mean the treatment did not include these items.       Loraine Barber, PT  5/10/2019

## 2021-05-30 VITALS — WEIGHT: 153.4 LBS | BODY MASS INDEX: 24.76 KG/M2

## 2021-05-30 NOTE — PROGRESS NOTES
[facility] Discharge Summary  Patient Name: Christen Chappell  Date: 6/28/2019  Referral Diagnosis: pelvic floor  Referring provider: Yoly Aguilera*  Visit Diagnosis:   1. Pelvic floor relaxation     2. Urinary urgency     3. Pelvic floor weakness         Goals:  Pt. will demonstrate/verbalize independence in self-management of condition in : 4 weeks - met    Pt will: no longer experience urinary urgency in 4 weeks - met  Pt will: be able to perform abdominal strengthening exercises without experiencing a feeling of prolapse in 4 weeks - ongoing      Patient was seen for 2 visits from 4/17/2019 to 5/10/2019    9The patient attended therapy initially, but did not finish the therapy sessions prescribed.  Goals were not fully achieved. Explanation for goals not achieved: Patient did not complete physical therapy sessions  Patient received a home program for pelvic floor strengthening  No further therapy is required at this time.    Therapy will be discontinued at this time.  The patient will need a new referral to resume.    Thank you for your referral.  Loraine Barber  6/28/2019  4:11 PM

## 2021-06-01 VITALS — HEIGHT: 67 IN | BODY MASS INDEX: 24.17 KG/M2 | WEIGHT: 154 LBS

## 2021-06-01 VITALS — WEIGHT: 153 LBS | BODY MASS INDEX: 24.01 KG/M2 | HEIGHT: 67 IN

## 2021-06-02 VITALS — HEIGHT: 67 IN | WEIGHT: 181.1 LBS | BODY MASS INDEX: 28.43 KG/M2

## 2021-06-02 VITALS — WEIGHT: 163.6 LBS | HEIGHT: 67 IN | BODY MASS INDEX: 25.68 KG/M2

## 2021-06-02 VITALS — WEIGHT: 175 LBS | HEIGHT: 67 IN | BODY MASS INDEX: 27.47 KG/M2

## 2021-06-02 VITALS — BODY MASS INDEX: 26.7 KG/M2 | HEIGHT: 67 IN | WEIGHT: 170.1 LBS

## 2021-06-02 VITALS — WEIGHT: 182.6 LBS | BODY MASS INDEX: 28.66 KG/M2 | HEIGHT: 67 IN

## 2021-06-02 VITALS — HEIGHT: 67 IN | BODY MASS INDEX: 24.52 KG/M2 | WEIGHT: 156.2 LBS

## 2021-06-02 VITALS — BODY MASS INDEX: 27.76 KG/M2 | HEIGHT: 67 IN | WEIGHT: 176.9 LBS

## 2021-06-02 VITALS — HEIGHT: 67 IN | WEIGHT: 157 LBS | BODY MASS INDEX: 24.64 KG/M2

## 2021-06-02 VITALS — WEIGHT: 170 LBS | HEIGHT: 67 IN | BODY MASS INDEX: 26.68 KG/M2

## 2021-06-02 VITALS — BODY MASS INDEX: 28.91 KG/M2 | HEIGHT: 67 IN | WEIGHT: 184.2 LBS

## 2021-06-02 VITALS — WEIGHT: 181.3 LBS | HEIGHT: 67 IN | BODY MASS INDEX: 28.46 KG/M2

## 2021-06-02 VITALS — HEIGHT: 67 IN | WEIGHT: 184 LBS | BODY MASS INDEX: 28.88 KG/M2

## 2021-06-04 VITALS
DIASTOLIC BLOOD PRESSURE: 63 MMHG | BODY MASS INDEX: 25.76 KG/M2 | RESPIRATION RATE: 18 BRPM | SYSTOLIC BLOOD PRESSURE: 99 MMHG | WEIGHT: 162 LBS | TEMPERATURE: 97.7 F | OXYGEN SATURATION: 100 % | HEART RATE: 66 BPM

## 2021-06-04 VITALS
SYSTOLIC BLOOD PRESSURE: 90 MMHG | HEART RATE: 74 BPM | BODY MASS INDEX: 24.6 KG/M2 | WEIGHT: 156.7 LBS | DIASTOLIC BLOOD PRESSURE: 68 MMHG | OXYGEN SATURATION: 100 % | HEIGHT: 67 IN

## 2021-06-05 NOTE — TELEPHONE ENCOUNTER
RN Assessment/Reason for Call:   Okay to leave Detailed Message  Christen calling in, has blood with her stools; has hemorrhoids  3 children, family hx colon cancer.    RN Action/Disposition:  Protocol recommends see Dr in 3 days.  1/13/2020 7:40a [facility] Dr Jefferson. New patient.  Offered WIC..  Call back if worse symptoms  Discussed home care measures.  Agrees to plan.     Jerrica Sigala, KATE    [facility] Triage/med refill  1/10/2020  3:03 PM        Reason for Disposition    MILD rectal bleeding (more than just a few drops or streaks)    Protocols used: RECTAL BLEEDING-A-AH

## 2021-06-05 NOTE — PROGRESS NOTES
ASSESSMENT:  1. Chronic migraine without aura without status migrainosus, not intractable    We discussed some strategies for this, given that she really does not want to take a lot of medications and is on to take anything that could interfere with her nursing her child.  She is already taking some ibuprofen so we discussed a weighted dose ibuprofen along with Tylenol and some caffeine to try to improve the effectiveness of the medications.  She will give that a try.  She is also going to try to keep track of what might be triggering these headaches.  It really seem to be all that often, so I do not think she needs any preventative medication at this time.  After she stops nursing, we can have another discussion to consider other options at that time.    2. Hair loss    This may just be related to her not being pregnant anymore, but we will go ahead and do a thyroid test because she is having some fatigue as well follow-up with her on the results of the become available.      - Thyroid Stimulating Hormone (TSH)  - Comprehensive Metabolic Panel    3. Generalized anxiety disorder      4. Screening, lipid    She is fasting and is interested in getting some lipids done and so we will do that for her today as well.      - Lipid Profile          PLAN:  There are no Patient Instructions on file for this visit.    Orders Placed This Encounter   Procedures     Thyroid Stimulating Hormone (TSH)     Lipid Profile     Order Specific Question:   Fasting is required?     Answer:   Yes     Comprehensive Metabolic Panel     There are no discontinued medications.    No follow-ups on file.    CHIEF COMPLAINT:  Chief Complaint   Patient presents with     Rectal Bleeding     F/U from [facility] for rectal Bleeding - rectal bleeding has stopped but [facility] told pt to F/U       HISTORY OF PRESENT ILLNESS:  Christen is a 34 y.o. female presenting to the clinic today as a new patient to our clinic.  She was seen at walk-in care a few days ago with  "some bright red blood per rectum.  She had noticed that over the last couple of days before that.  She had an appointment established with me but then was told to go and if it got worse than she did.  She was examined and then note can be found in the chart.  In summary they thought she had a bit of an internal hemorrhoid and reassured her that it was likely nothing else to be concerned with.  She has been having some harder stools recently.  She reports that that is all but resolved now and she is not having any further episodes of rectal bleeding or blood on the paper etc.  She is not really having any pain or itching in the rectal area.  She is been considering using some things to help soften up her stools a bit.      She has a lot of other minor issues that she brings up quickly today.  1 of them is that she feels like she is losing some hair, and she understands that that can happen after being pregnant but she feels like this is a bit worse as well as the fact that she is having some fatigue and she wants to get a thyroid level done.    REVIEW OF SYSTEMS:     All other systems are negative.    PFSH:    Reviewed    Patient is new patient to the clinic. Please see past medical history, surgical history, social history and family history, all of which were completed in their entirety today.     TOBACCO USE:  Social History     Tobacco Use   Smoking Status Never Smoker   Smokeless Tobacco Never Used       VITALS:  Vitals:    01/13/20 0751   BP: 90/68   Patient Site: Left Arm   Patient Position: Sitting   Cuff Size: Adult Regular   Pulse: 74   SpO2: 100%   Weight: 156 lb 11.2 oz (71.1 kg)   Height: 5' 6.5\" (1.689 m)     Wt Readings from Last 3 Encounters:   01/13/20 156 lb 11.2 oz (71.1 kg)   01/11/20 162 lb (73.5 kg)   04/04/19 157 lb (71.2 kg)     Body mass index is 24.91 kg/m .    PHYSICAL EXAM:  Constitutional:  Well appearing patient in no acute distress.  Vitals:  Per nursing notes.    HEENT:  Normocephalic, " atraumatic.  Ears are clear bilaterally, with no fluid or redness, and landmarks visible.  Pupils are equal and reactive to light, extraocular muscles intact, visual fields are full.  Nose is normal, and oropharynx is clear without redness.    Neck is without lymphadenopathy.    Lungs:  Clear to auscultation bilaterally without wheezes, rales or rhonchi.   Cardiac:  Regular rate and rhythm without murmurs, rubs, or gallops.     Legs show no cyanosis, clubbing or edema.  Palpation of the distal pulses are normal and symmetric.    Neurologic:  Cranial nerves II-XII intact.   Normal and symmetric reflexes in extremities, with normal strength and sensation.  Psychiatric:  Mood appropriate, memory intact.         MEDICATIONS:  Current Outpatient Medications   Medication Sig Dispense Refill     cholecalciferol, vitamin D3, 1,000 unit tablet Take 1,000 Units by mouth.       ferrous sulfate 325 (65 FE) MG tablet Take 1 tablet (325 mg total) by mouth 2 (two) times a day. 90 tablet 0     OMEGA-3/DHA/EPA/FISH OIL (FISH OIL HIGH POTENCY ORAL) Take by mouth.       prenatal vitamin iron-folic acid 27mg-0.8mg (PRENATAL S) 27 mg iron- 800 mcg Tab tablet Take 1 tablet by mouth daily.       No current facility-administered medications for this visit.

## 2021-06-16 PROBLEM — G43.709 CHRONIC MIGRAINE WITHOUT AURA WITHOUT STATUS MIGRAINOSUS, NOT INTRACTABLE: Status: ACTIVE | Noted: 2020-01-13

## 2021-06-16 PROBLEM — F41.1 GENERALIZED ANXIETY DISORDER: Status: ACTIVE | Noted: 2020-01-13

## 2021-06-16 PROBLEM — N81.89 PELVIC FLOOR RELAXATION: Status: ACTIVE | Noted: 2019-04-04

## 2021-06-17 NOTE — PATIENT INSTRUCTIONS - HE
Patient Instructions by Justus Castro PA-C at 1/11/2020  8:10 AM     Author: Justus Castro PA-C Service: -- Author Type: Physician Assistant    Filed: 1/11/2020 11:17 AM Encounter Date: 1/11/2020 Status: Addendum    : Justus Castro PA-C (Physician Assistant)    Related Notes: Original Note by Justus Castro PA-C (Physician Assistant) filed at 1/11/2020 11:16 AM           You had a complete blood count (CBC) that did not show anemia or blood loss.  Your fecal occult blood test that was done in the office and was positive for occult blood in the rectum.  You have no direct relative for colon cancer.  There is a family history of colon cancer.  Since you have rectal bleeding and a concern we will send you to the GI for consult for concern of rectal bleeding.  The most likely cause of a small amount of rectal bleeding is a rectal fissure when you pass a large formed stool.  This may or may not pertain to you.    Multiple other tests were run today in the office to include strep, flu, metabolic panel looking at electrolyte imbalances, your function of your internal organs to include your kidneys, chest x-ray, and EKG urine test and a urine pregnancy test.  All of the above labs were not indicating anything that was abnormal with the exception of a slow heartbeat.    I suggest you follow-up with your primary care provider on Monday, Dr. Jefferson as scheduled, otherwise returning to the emergency room over the weekend if you should have any lingering concerns or feel that new symptoms are changing or worsening.  Please refer to the handouts on bradycardia for indication to return to the emergency room over the weekend before seeing your family doctor.      Patient Education     Evaluating and Treating Rectal Bleeding     As part of your evaluation, a flexible sigmoidoscopy or colonoscopy may be done. You may also have an upper endoscopy if your stools are darker.   To find the site and cause of your bleeding, you will  have a physical exam. You will be asked about your health history. Tests may be done to help confirm the diagnosis and plan your treatment.  Tests you may have  Any of these tests may be done:    Stool sample. A small amount of your stool will be checked for blood.    Anoscopy. This test uses a small tube (anoscope) to examine the anus. It checks for problems such as hemorrhoids.    Sigmoidoscopy. This test uses a lighted tube to check your rectum and the part of the large intestine that is closest to the rectum (the sigmoid colon).    Colonoscopy. This test looks at your rectum and entire colon. You may be given medicine through an IV to help you relax.    Lower GI (gastrointestinal) series, or barium enema. This is an X-ray test to view your colon. A milky liquid containing barium is passed through your rectum and into the colon. This liquid makes it easy to see your colon on the X-ray.    Upper endoscopy. This test checks your esophagus, stomach, and upper small intestine. It's done in cases of rectal bleeding along with other symptoms like low blood pressure and rapid heartbeat. This test may also be done if your stools are dark black and tarry.    Capsule endoscopy. For this test, you swallow a pill that has a tiny camera inside. The camera takes pictures of your small intestine. It can get to areas that are hard to reach with colonoscopy and upper endoscopy.    Balloon enteroscopy. This test uses a special tube (scope) to get deep into the small intestine.    Tagged red blood cell scan. This test marks (tags) red blood cells with very small amounts of radioactive material. The cells can then be seen and tracked on a scan.    Angiography. This test threads a tube (catheter) through a vein, often in the leg. The tube injects dye into your blood vessels to see where the bleeding is taking place.  Your treatment plan  Your treatment will depend on the cause of your rectal bleeding. Your healthcare provider will  create a treatment plan thats right for you. Sometimes rectal bleeding stops on its own. If it does, be sure to see your provider to check that the problem wasnt serious.  What you can do  Follow all your doctors instructions. Keep working with your doctor after your treatment. Make and keep your follow-up visits. If you have more rectal bleeding, call your doctor. It may be a sign of the same or another health problem.  Date Last Reviewed: 7/1/2016 2000-2019 [employer]. [address] [place] [address]. All rights reserved. This information is not intended as a substitute for professional medical care. Always follow your healthcare professional's instructions.           Patient Education     Understanding Rectal Bleeding    Rectal bleeding is when blood passes through your rectum and anus. It can happen with or without a bowel movement. Rectal bleeding may be a sign of a serious problem in your rectum, colon, or upper GI tract. Call your healthcare provider right away if you have any rectal bleeding.  The GI tract  The gastrointestinal (GI) tract includes the:    Mouth    Esophagus    Stomach    Small intestine    Large intestine (colon)    Rectum    Anus     The food you eat is digested as it passes through the GI tract. Solid waste leaves the body through the rectum.  Rectal bleeding and GI problems  The cause of rectal bleeding may be found in any part of the GI tract. The colon or rectum may be the site of your bleeding problem. Or bleeding may be due to problems farther up the GI tract, such as in the small intestine, duodenum, or stomach.  Causes of rectal bleeding  These are some possible causes:    Hemorrhoids (swollen veins in the rectum and anus)    Fissures (tears in or near the anus)    Diverticulosis (inflamed pockets in the colon wall)    Infection    Ischemia (low blood flow)    Radiation damage    Inflammatory bowel disease (Crohn's disease or ulcerative colitis)    Ulcers  in the upper GI tract and inflammation of the large intestine    Abnormal tissue growths (tumors or polyps) in the GI tract    A bulging rectum (also called a rectal prolapse)    Abnormal blood vessels in the small intestine or in the colon  Common symptoms  Common symptoms are:    Rectal pain, itching, or soreness    Belly pain, including upper belly pain near the stomach (epigastric pain)    Small drops of blood that sometimes appear on the stool or toilet paper    Stool that looks black or tarry   Rectal bleeding can also happen without pain.  Date Last Reviewed: 7/1/2016 2000-2019 [employer]. [address] [place] [id number]. All rights reserved. This information is not intended as a substitute for professional medical care. Always follow your healthcare professional's instructions.           Patient Education     Bradycardia    When your heart rate is slow, less than 60 beats per minute, it is called bradycardia. Bradycardia can be normal, caused by medicines, or a sign of a disease. The slow heart rate may not be constant; it can come and go. It is a concern when it is very low, or you have symptoms.  Signs and symptoms  The following are signs and symptoms of bradycardia:    Heart rate less than 60 per minute    Dizziness or feeling lightheaded    Weakness    Trouble breathing    Fainting    Sleepiness    More trouble exercising than usual because of fatigue    Confusion or trouble concentrating  Causes  There are many causes of bradycardia. Some can be related to your heart, but some may be related to other factors.  Non-heart-related causes:    Advanced age    Side effect of certain medicines (such as beta-blockers, calcium channel blockers, digitalis, antiarrhythmic medicines like amiodarone, clonidine, lithium)    Medical conditions such as hypoglycemia (low blood sugar), hypothyroidism (low thyroid), electrolyte disorder,  hypothermia, sleep apnea    Athletes, especially  long-distance runners, may have a slow heart rate. This can be normal.    Sleep apnea    Brain injury such as stroke or bleeding inside the brain  Heart-related causes:    Coronary artery disease (angina or prior heart attack, also known as acute myocardial infarction, or AMI)    Heart valve disease    Heart muscle disease (cardiomyopathy)    Congestive heart failure    Sick sinus syndrome, which is when your heart's natural pacemaker is no longer working properly    Diseases that infiltrate the heart such as sarcoid    Heart infections  Sometimes the cause for the arrhythmia cannot be found.  Bradycardia that causes symptoms is sometimes reversible, and can be treated with medicines. When more severe bradycardia persists, a pacemaker is generally recommended. When the bradycardia does not cause symptoms, your doctor may decide to evaluate it in his or her office.  Home care  The following will help you care for yourself at home:    Resume your usual activities when you are feeling back to normal.    If you develop any of the symptoms below during exertion, then you should not exert yourself until evaluated further by your doctor.    Work with your doctor on any needed lifestyle changes, such as changing your diet, stopping smoking if you are a smoker, and a planned exercise program.  Follow-up care  Follow up with your doctor, or as advised.  Call 911  Call 911 if any of the following occur:    Chest pain    Trouble breathing    Slow heart rate with dizziness or lightheadedness    Fainting or loss of consciousness    Chest, shoulder, arm, neck, or back pain    Slow heart rate (under 50 beats per minute) if associated with symptoms  When to seek medical advice  Call your healthcare provider right away if any of the following occur:    Occasional weakness, dizziness, or lightheadedness  Date Last Reviewed: 4/25/2016 2000-2017 [employer]. [address] [place] [id number]. All rights  reserved. This information is not intended as a substitute for professional medical care. Always follow your healthcare professional's instructions.           Patient Education     Understanding Bradycardia    Your heart has an electrical system that sends signals to control the heartbeat. Any abnormal change in the speed or pattern of the heartbeat is called an arrhythmia. An arrhythmia that causes the heart to beat slower than normal is called bradycardia. There are many types of bradycardia. In healthy children and adults, bradycardia is often normal, particularly during sleep. Sometimes bradycardia is caused by failure of the hearts natural timer or failure of the electrical pathways within the heart. Depending on the type you have and how severe it is, you may need treatment.  What causes bradycardia?  Many things can cause bradycardia, including:     The natural aging  process    Coronary artery disease    Heart attacks    Heart muscle disease    Problems with the SA node. This is the hearts natural pacemaker that starts each heartbeat.    Problems with the electrical pathways in the heart    Problems with the structure of the heart that you are born with    Infection    Use of certain medicines    Electrolyte imbalances    Underactive thyroid     Sleep apnea    Increased pressure in the brain or stroke   Well-conditioned athletes often have a naturally slow heart rate.  What are the symptoms of bradycardia?  Bradycardia can cause an irregular heartbeat. It can also make it harder for the heart to pump blood to the body. This may cause symptoms such as:    Tiredness (fatigue)    Weakness    Loss of ability to exercise    Shortness of breath    Dizziness or fainting    Chest pain    Swelling in your legs and feet  Some people with bradycardia have no symptoms at all.  How is bradycardia treated?  Treatment for bradycardia depends on the cause. It also depends on the type you have and how severe your symptoms  are. If you need treatment, your options may include:    Treatment of the underlying cause, if the cause can be fixed. For instance, if a medicine is causing bradycardia, stopping the medicine, under your doctors guidance, may correct the problem. Or if a condition such as an underactive thyroid is the cause, treating the thyroid may keep bradycardia from coming back.    Medicines. Medicines may be used to treat conditions that cause bradycardia. Some medicines can also be used in the short-term to increase the heart rate. These are generally given with an IV (intravenous) and therefore are not long-term solutions.     Pacemaker. This is a device that is placed permanently under the skin (usually in your chest) and connected to your heart. The device monitors your heart, and when the heart beats too slowly, the device sends electrical impulses to keep the heart beating at the right pace.    Temporary pacemaker. A temporary pacemaker may be connected to the heart using wires guided through a blood vessel in your neck or leg to the heart. This is also sometimes done using special pads placed on the chest. This may be used in an emergency, as a bridge to permanent pacing, when pacing is only needed short-term, or to further evaluate your condition.  What are possible complications of bradycardia?  These can include:    Development of other types of arrhythmias    Heart failure. This problem occurs when the heart weakens so much that it no longer pumps blood well.    Sudden cardiac arrest. This is when the heart suddenly stops beating.  When should I call my healthcare provider?  Call your healthcare provider right away if you have any of these:    Symptoms that dont get better with treatment, or get worse    New symptoms  Date Last Reviewed: 5/1/2016 2000-2019 [employer]. [address] [place] [address], [place] [address]. All rights reserved. This information is not intended as a substitute for professional  medical care. Always follow your healthcare professional's instructions.

## 2021-06-19 NOTE — PROGRESS NOTES
PRENATAL VISIT   FIRST OBSTETRICAL EXAM - OB    Assessment / Impression     , Normal first prenatal visit at 9 weeks 4 days gestation  Previous  for fetal intolerance  Previous   History PPH    Plan:        1. Initial labs drawn including: IOB labs today  2. Not eligible for WB, Hepatitis C not today  3. Pap smear 17, negative, HPV negative  4. GC/CT declined  5. Lead screen questionnaire, negative  6. EPDS 4, denies any concerns with depression or anxiety.   7. Medications: Prenatal vitamins. Encouraged a vitamin D3, an omega 3 fatty acid supplement daily as well.   8. Problem list reviewed and updated.  9. Genetic screening: discussed and declined.  10. Role of ultrasound in pregnancy discussed; dating/viability ultrasound declined  11. Optimal nutrition and weight gain discussed. Pregnancy weight gain of 25-35 lbs (BMI 18.5-24.9) encouraged.   12. Oriented to CNM care and philosophy;  group, on-call and contact info discussed.  13. Discussed consultation with Dr. Hernandez for , patient to schedule.   14. Discussed IV access in labor and continuous fetal monitoring, patient agreeable.   15. Appropriate anticipatory guidance including nutrition & supplements, weight gain recommendations, exercise, resources, lab testing & warning signs discussed.  .  16. Follow up: 4 weeks    TT with patient 40 minutes >50% time spent in counseling or coordination of care.    Subjective:    Christen Chappell is a 32 y.o.  here today for her First Obstetrical Exam. Here by herself  Patient's last menstrual period was 2018 (exact date).  Last period was normal. Happy to be coming to see CNM's with this pregnancy.  Plans low intervention labor and birth.  Denies any concerns with this pregnancy or her health.  Currently breastfeeding her 2nd child but plans to wean child soon.      Current symptoms also include: fatigue.    OB History    Para Term  AB Living   4 2 2   2   SAB TAB Ectopic  "Multiple Live Births      0 2      # Outcome Date GA Lbr Gilson/2nd Weight Sex Delivery Anes PTL Lv   4 Current            3 Term 16 39w2d 03:00 / 02:44 7 lb 10 oz (3.459 kg) M Vag-Spont None N MATTHIEU   2 Term 14 41w3d 00:53 / 00:39 8 lb 0.4 oz (3.64 kg) M CS-LTranv Gen N MATTHIEU      Complications: Fetal Intolerance   1                    Not found.  Past Medical History:   Diagnosis Date     Abnormal Pap smear of cervix      Mental disorder     generalized anxiety     No past surgical history on file.  Social History   Substance Use Topics     Smoking status: Never Smoker     Smokeless tobacco: Not on file     Alcohol use No     Current Outpatient Prescriptions   Medication Sig Dispense Refill     cholecalciferol, vitamin D3, 1,000 unit tablet Take 1,000 Units by mouth.       fluticasone (FLONASE) 50 mcg/actuation nasal spray 1 spray into each nostril daily.       OMEGA-3/DHA/EPA/FISH OIL (FISH OIL HIGH POTENCY ORAL) Take by mouth.       prenatal vitamin iron-folic acid 27mg-0.8mg (PRENATAL S) 27 mg iron- 800 mcg Tab tablet Take 1 tablet by mouth daily.       prenatal vitamin iron-folic acid 27mg-0.8mg (PRENATAL S) 27 mg iron- 800 mcg Tab tablet Take 1 tablet by mouth.       No current facility-administered medications for this visit.      No Known Allergies     College Graduate  Works as      High Risk Behavior: Previous , Anxiety and depression    Review of Systems  General:  Denies problem  Eyes: Denies problem  Ears/Nose/Throat: Denies problem  Cardiovascular: Denies problem  Respiratory:  Denies problem  Gastrointestinal:  Denies problem, Genitourinary: Denies problem  Musculoskeletal:  Denies problem  Skin: Denies problem  Neurologic: Denies problem  Psychiatric: Denies problem  Endocrine: Denies problem        Objective:   Objective    Vitals:    18 0832   BP: 102/66   Pulse: 64   Weight: 153 lb (69.4 kg)   Height: 5' 6.5\" (1.689 m)     Physical Exam:  General " Appearance: Alert, cooperative, no distress, appears stated age  Skin: Skin color, texture, turgor normal, no rashes or lesions  Throat: Lips, mucosa, and tongue normal; teeth and gums normal  HEENT: grossly normal; otoscopic and opthalmic exam not performed.   Neck: Supple, symmetrical, trachea midline, no adenopathy;  thyroid: not enlarged, symmetric, no tenderness/mass/nodules  Lungs: Clear to auscultation bilaterally, respirations unlabored  Breasts: No breast masses, tenderness, asymmetry, or nipple discharge.  Heart: Regular rate and rhythm, S1 and S2 normal, no murmur, rub, or gallop   Abdomen: Soft, non-tender, no masses, no organomegaly  Pelvic:Normally developed genitalia with no external lesions or eruptions. Vagina and cervix show no lesions, inflammation, discharge or tenderness. No cystocele, No rectocele. Uterus 9 week size.  No adnexal mass or tenderness.  Extremities: Extremities normal without varicosities or edema      Lab:   Results for orders placed or performed in visit on 06/05/17   Gynecologic Cytology (PAP Smear)   Result Value Ref Range    Case Report       Gynecologic Cytology Report                       Case: [id number]                                   Authorizing Provider:  Trinity Rush MD       Collected:           06/05/2017 1424              First Screen:          JOSIANE Baker (ASCP)   Received:            06/06/2017 0920              Specimen:    SUREPATH PAP, SCREENING, Endocervical/cervical                                             Interpretation       Negative for squamous intraepithelial lesion or malignancy    Result Flag Normal Normal    Specimen Adequacy       Satisfactory for evaluation, endocervical/transformation zone component present    HPV Reflex? Yes regardless of result     HIGH RISK No     LMP/Menopause Date 3/28/2016     Abnormal Bleeding No     Pt Status N/A     Birth Control/Hormones None     Previous Normal/Date 5/2014     Prev Abn Date/Dx 2009      Cervical Appearance normal    HPV Cascade (PCR)   Result Value Ref Range    Interpretation No HPV Type(s) Detected No HPV Type(s) Detected, No High Risk HPV Type(s) Detected, DNA Quantity Not Sufficient     Eldon Metcalf MD, [facility]

## 2021-06-20 NOTE — PROGRESS NOTES
Christen is here today for routine prenatal visit by herself. Declines Quad screen testing.Disc quickening and early fetal movements. Referral for screening ultrasound at 20 weeks placed on chart. Pt will self schedule. Encouraged to sign up for [facility]

## 2021-06-20 NOTE — PROGRESS NOTES
Christen Chappell is here with her  for a routine prenatal visit at 13w4d.  She has no concerns and is feeling well. Reviewed IOB lab results.  First trimester screening was discussed and declined.  Appetite is normal. Interval weight gain is appropriate.  Anticipatory guidance, warning signs, when to call and CNM contact information reviewed.   RTO in 4 weeks.

## 2021-06-21 NOTE — PROGRESS NOTES
Christen is here today, alone, for a routine prenatal at 21w4d. Feeling very well overall, has had very few discomforts with this pregnancy thus far. FAS results reviewed, she declines further testing for the EIF, having a girl! Weight gain on target at 10lbs today.  consult discussed and referral placed. Aware that she will need an IV and continuous FM in labor. Agreeable to GDM screening, encouraged to return to clinic in 6 weeks to capture GDM screening timeline. No questions at this time.

## 2021-06-22 NOTE — PROGRESS NOTES
Christen is here alone today.  She is feeling well.  She is currently on for low from her job as she is a .  She is hoping to be back soon, as they are not able to get paid while they are not working.  She is unsure if they will get back pain when she does return to work.  She had her  consult with Dr. Hernandez on  and signed a consent at that time.  We discussed active management of third stage of labor and why we would recommend this given her history of postpartum hemorrhage.  She understands why we would do this, but is asking if we may be able to watch and see how her bleeding does, as she does not want to use any medications.  Her plan is to have an unmedicated birth.  Her last birth, which was a , was unmedicated as well.  We discussed painting in her home, and recommended that she have good ventilation while this is done.  Completed 32-week check list.  Is having active fetal movement and no signs or symptoms of  labor or preeclampsia.

## 2021-06-22 NOTE — PROGRESS NOTES
Christen is here today alone for a routine prenatal visit. She has no concerns and is feeling well. She notes active fetal movement. She denies leakage of fluid, vaginal bleeding, and regular contractions. She has questions about perineal massage and hypnobirthing. Her desires are to reduce tearing. We discussed perineal massage is more beneficial for a primip but would not be harmful to start once a day at 36wks.  Discussed that this practice may not help the tissues stretch but can help prepare for the sensation of stretching and burning that comes with delivery and allow more controlled pushing due to desensitization to that feeling.  During the second stage of labor aim for more controlled and less expulsive pushing. We shared resources of hypnobirthing/hypnobabies and spinning babies on [employer]. Patient has an appt with Dr. Hernandez on 18 for TOLAC consult. Reviewed signs and symptoms of  labor, fetal movement patterns, and previous birth. She had a fast , ctx started around 5pm and was complete at midnight. We discussed coming to the hospital sooner than later. She lives close to the hospital. She plans to breastfeed and has breast fed her two children for 20 months. GCT, hgb, RPR, Tdap done today. RTO in 4 weeks.  Please review Hx of PPH and recommendation for AMTSL.    Patient was seen with student Barbi Acosta who was present for learning.  I personally assessed, examined and made clinical decisions reflected in the documentation.   Haleigh Abel CNM

## 2021-06-22 NOTE — PATIENT INSTRUCTIONS - HE
[facility] Nurse Midwives - Contact information:  Appointment line and to get a hold of CNM in clinic Monday-Friday 8 am - 5 pm:  [phone].  There are some clinics with early start times (1st appointment 7:40 am) and others with evening hours (last appointment 6:20 pm).  Most are typically open from 8 am to 5 pm.    CNM on call answering service: [phone].  Specify your hospital of choice and leave a brief message for CNM;  will then page CNM who is on call at your specified hospital and you should receive a call back with 15 minutes.  Be sure that your ringer is audible and that you can accept blocked calls so that we can get back in touch with you! This number should be reserved for urgent needs if during the day, before 8 am, after 5 pm, weekends, holidays.    Contact the on-call CNM with warning signs, such as:    vaginal bleeding     Vaginal discharge and itching or pain and burning during urination    Leg/calf pain or swelling on one side    severe abdominal pain    nausea and vomiting more than 4-5 times a day, or if you are unable to keep anything down    fever more than 100.4 degrees F.        You are invited to  Meet the [facility] Nurse-Midwives  A way to tour the hospital Labor and Delivery unit and meet the midwives that attend births since you may not have the opportunity to meet them during your prenatal care.  Some sessions are informal meet and greet type social hours, others address a specific concern or topic.    Tuesday, Februrary 12, 2019 7-8pm  [facility]    Wednesday, April 17, 2019 7-8pm  [facility]    Thursday, August 15, 2019 7-8pm  [facility]    Wednesday November 13, 2019 7-8 pm  [facility]    Please call [phone] to register          Touring the Maternity Care Center  To schedule a tour of [facility], call [phone]    To schedule a tour  "of [facility], call [phone]      Pre-registration for Hospital Stay:  Sometime betweeen 30-37 weeks, it is recommended that you \"pre-register\" for your upcoming hospital stay on our website:    [email or web address]    Breastfeeding and Birth Control  How do I decide what birth control method is best for me while I am breastfeeding?  Choosing a method of birth control is very personal. First, answer the following questions:      Do you want to have more children?    How much spacing between births do you want for your children?    Do you smoke or have you had any health problems, such as liver disease or a blood clot?  Talk about the answers to each of these questions with your health care provider to help you choose the best method for you.    Can I use breastfeeding as my birth control?  Using breastfeeding as your birth control (the lactational amenorrhea method) can be a good way to keep from getting pregnant in the first months after the baby is born. Each time your baby nurses, your body releases a hormone called prolactin, which stops your body from making the hormones that cause you to ovulate (release an egg). If you are not ovulating, you cannot get pregnant.    The lactational amenorrhea method works only if:    you have not started your period yet.    you are breastfeeding only and not giving your baby any other food or drink.    you are breastfeeding at least every 4 hours during the day and every 6 hours at night.    your baby is less than 6 months old.  When any 1 of these 4 things is not happening, you no longer have good protection from getting pregnant, and you should use another form of birth control.    What birth control methods are safe for me to use while I breastfeed?    Methods without hormones  Methods without hormones do not affect you, your baby, or your breastfeeding.  Methods without hormones that are the most effective    The copper " intrauterine contraceptive device (IUD) (ParaGard) is a small, T-shaped device that is in- serted into your uterus (womb) through the vagina and cervix. The copper IUD lasts for 10 years.    Sterilization (getting your tubes tied or your partner having a vasectomy) is very effective, but it is per- manent. You should choose sterilization only if you do not want to have more children.  A method without hormones that is effective    The lactational amenorrhea method described above is effective for the first 6 months.  Methods without hormones that are less effective    Natural family planning is monitoring your body for signs of ovulation and not having sex when you think you are ovulating. This method is reliable only if you are having regular periods every month.    Barrier methods (condoms, diaphragms, sponges, and spermicides) are used at the time you have sex. These methods are effective only if you use them correctly every time.    Methods with hormones  Birth control methods that use hormones can be used while you are breastfeeding. They may have a small effect on lowering the amount of milk you make. All hormones will get into your breast milk in very small amounts, but there is no known harm to your baby from this small amount of hormone in breast milk.only methodsmethods use only 1 hormone, called progestin. You can start them right after your baby is born or wait 4 to 6 weeks to make sure your milk supply is good.     Progestin-only pills ( minipills ): If you like to take pills every day, you can use the minipill. In order forpill to work well, you have to take 1 at the same time each day. When you stop breastfeeding, you should start pills that have both estrogen and progestin because they are better at keeping you from get- ting pregnant.     Progestin IUD (Mirena): The progestin IUD is shaped and inserted into the uterus like the copper IUD. It works for up to 5 years. Both IUDs are usually inserted 4  to 6 weeks after the baby is born.    Progestin implant (Nexplanon): The progestin implant is a small matchstick-sized flexible niyah. It is placed into the fatty tissue in the back of your arm. It works for up to 3 years.    Progestin shot (Depo-Provera): The progestin shot is given every 3 months.    estrogen and progestin methods  These methods use 2 hormones, called estrogen and progestin.     These methods increase your risk of a blood clot, which is already higher than normal after you have a baby. You should not use them until your baby is at least 6 weeks old. The combined methods are not recommended as the first choice for women who are breastfeeding. If a combined method is the one that you feel will be best for you to prevent getting pregnant, these methods are okay to use while breastfeeding.     Combined birth control pills: You take a pill each day.    Vaginal ring (NuvaRing): The ring is worn in the vagina for 3 weeks then left out for 1 week before youin a new ring.    Patch (Ortho Evra): The patch is placed on your skin and changed every week for 3 weeks then left off forweek before putting a new patch on a different area of your skin.    Pediatric Care Providers at [facility]:    Choosing the right provider is one of the most important decisions you ll make about your health care. We can help you find the right one. Remember, you re looking for a provider you can trust and work with to improve your health and well-being, so take time to think about what you need. Depending on how complicated your health care needs are, you may need to see more than one type of provider.    Primary Care Providers: You ll see a primary care provider first for most health issues. They ll work with you to get your recommended screenings, help you manage chronic conditions, and refer you to other types of providers if you need them. Your primary care provider may be called a family physician or doctor, internist, general  practitioner, nurse practitioner, or physician s assistant. Your child or teenager s provider may be called a pediatrician.  Specialists: You ll see a specialist for certain services or to treat specific conditions. Specialists include cardiologists, oncologists, psychologists, allergists, podiatrists, and orthopedists. You may need a referral from your primary care provider before you go to a specialist in order for your health plan to pay for your visit.\pardHere are some tips for finding a provider where you live:  If you already have a provider you like and want to keep working with, call their office and ask if they accept your coverage.  Call your insurance company or state Medicaid and CHIP program. Look at their website or check your member handbook to find providers in your network who take your health coverage.  Ask your friends or family if they have providers they like and use these tools to compare health care providers in your area.    Family Medicine at [facility]: [email or web address]/[email or web address]/family-medicine.html    Many of our families enjoy all seeing the same doctor, who comes to know the whole family very well. We base our practice on the knowledge of the patient in the context of family and community.  WHY CHOOSE A FAMILY MEDICINE PHYSICIAN?  Ability of the whole family to see the same doctor  Focus on the whole person, including physical and emotional health  A personal relationship with their doctor that is nurtured over time  Respect for individual and family beliefs and values  No need to change primary care providers when a certain age is reached  Coordination of care when other health care services are needed    Pediatrics at [facility]:   [email or web address]    Through a teaching affiliation with the [facility], [facility] staff keeps current on new developments in the field of pediatrics.     Everything we do centers around caring  for children. We place special emphasis on wellness and prevention.pediatric care team includes a team of pediatricians and certified nurse practitioners who provide care to pediatric and adolescent patients ages 0 to 18, and some up to the age of 26. We offer preventive health maintenance for healthy children as well the diagnosis and treatment of common and chronic illnesses and injuries. In addition, we also offer several pediatric specialists who focus on adolescent health issues and developmental and behavioral issues.    Circumcision  What is circumcision?  At birth, baby boys have loose skin that covers the head of the penis. This skin is called the foreskin. When all or part of the foreskin of the penis is cut off, this is called circumcision.  Why is circumcision done?  Circumcision is done for many reasons including Yazdanism, cultural, looks, and health. Some Yazdanism groups circumcise all boys as a nikos-based practice. Many people in the [place] choose to circumcise their baby boys because they believe it is culturally normal. It is not a common practice in [place], [place], or [place].  Some parents choose circumcision so that their son will have a penis that looks like his father s if the father was also circumcised. Other people choose circumcision because they believe it is  or will protect the boy or man from infection or cancer later in life.  Does circumcision protect against infection or cancer?  Circumcision does seem to protect against some types of infection or cancer. Cancer of the penis is one type of cancer that circumcision may prevent. However, cancer of the penis is very rare. One hundred thousand circumcisions would need to be done to prevent one case of cancer of the penis. Circumcision may also decrease the chance of some sexually transmitted infections, such as HIV and human papilloma virus (HPV). See the next page for more information on the risks and benefits of  circumcision.  What happens during a circumcision?  Babies born in the hospital are usually circumcised before they go home. Health care providers also perform circumcisions in their offices and clinics within a few weeks after birth.  Orthodox circumcisions are most often done at home or in a Congregational.  Before the circumcision is performed, some providers give an injection (shot) of a small amount of anesthetic (numbing medicine) at the base of the penis to block the pain or put an anesthetic cream on the penis to numb the area that will be cut.  There are 2 different ways to do a circumcision. In one type, a clamp placed around the head of the penis cuts off the blood supply to the foreskin, and the foreskin above the clamp is cut off. The clamp is left on the penis until the area heals and it falls off a few days later. In another type of circumcision, the foreskin is cut off with scissors or a scalpel.  After the circumcision, petroleum jelly and sometimes gauze may be put over the area of the penis where the skin was removed. This protects the end of the penis while it heals.  Can I keep my son s penis  if it is circumcised?  Regular washing with soap and water will keep any penis clean. Circumcision does not make the penis . Uncircumcised boys do need to be taught to clean beneath their foreskin, just like they need to be taught to wash their hands or brush their teeth.  How do I decide if I should have my son circumcised?  The American Academy of Pediatrics (AAP) says that  circumcision may have health benefits. They do not recommend circumcision for all boys as a routine procedure. The AAP recommends that you talk to your health care provider to decide if circumcision is the right choice for your family. You may also wish to discuss the question with your family or .  What are the risks and benefits of circumcision?  We do not have a lot of good scientific information  about the health risks or benefits of circumcision.  Possible Risks:  Very few baby boys (less than 1 in 100) will have a problem after circumcision, such as bleeding or mild infection of the penis. These problems are usually not serious and are easy to treat.  Less common problems are:    Removal of too much or too little foreskin  Some rare problems are:    Narrowing of the opening of the penis, which can cause problems with urination    Removal of part of the penis or death of some of the other skin on the penis   Infection that spreads to other parts of the body  People used to think babies did not really feel pain. Now we know that they do. Many baby boys appear to feel a lot of pain during circumcision if anesthesia is not used.  We do not know if circumcision affects sexual function or sensation.  Possible Benefits:    Less risk for some kinds of cancers, like cancer of the penis    Fewer urinary tract (bladder or kidney) infections for babies    Less risk for some sexually transmitted infections, such as HIV, herpes, and HPV     May protect female sexual partners from some sexually transmitted infections    For More Information  American Academy of Family Physicians: Circumcision  http://familydoctor.org/familydoctor/en/pregnancy-newborns/caring-for-newborns/infant- care/circumcision.html  MedlinePlus: Circumcision (includes a slide show on the procedure)  www.nlm.nih.gov/medlineplus/circumcision.html  American Academy of Pediatrics: Policy statement on circumcision  Http://pediatrics.aappublications.org/content/130/3/e756.abstract      Childbirth and Parenting Education:   [facility]: http://[facility] Joseph[facility]ave[facility]afety.com/   ([phone] [phone]-BABY  [place]: (education, yoga & wellness) www.[employer]Poster.[employer]  Enlightened Mama: www.enlightenedmama.com   Childbirth collective: (Parent topic nights)  www.childbirthcollective.org/  Hypnobabies:  www.hypnobabiestConnectemcities.com/  Hypnobirthing:   Http://hypnobirthing.com/    Book Recommendations:   Karen Randal's Birthing From Within--first few chapters include a new-age tone, you may prefer to skip it and keep going, because there is good stuff later.  This book recommendation covers emotional preparation, but does cover coping with pain, and use of both pharmacological and nonpharmacological methods.    Dr. Ness' The Pregnancy Book and The Birth Book--the pregnancy book goes month-by month    Womanly Art of Breastfeeding by La Leche League International   Bestfeeding by Jennifer Blanchard--great pictures    Mothering Your Nursing Toddler, by Roxie Sanchez.   Addresses dealing with so many of the challenging behaviors of a nursing toddler.  How Weaning Happens, by La Leche League.  Discusses weaning at all ages, from medically necessary weaning of an infant, all the way up to age 5 (or older), with why/why not, and strategies.  Very empowering book both for deciding to wean and deciding not to.    American College of Nurse-Midwives (ACNM) http://www.midwife.org/; look at the informational handouts at http://www.midwife.org/Share-With-Women     www.mymidwife.org    Mother to Baby (Medication and Herbal guidance in pregnancy): http://www.mothertobaby.org  Toll-Free Hotline: 842.193.2566  LactMed (Medication use while breastfeeding): http://toxnet.nlm.nih.gov/newtoxnet/lactmed.htm    Women's Health.gov:  http://www.womenshealth.gov/a-z-topics/index.html    American pregnancy association - http://americanpregnancy.org    Centering Pregnancy (group prenatal care option): http://centeringhealthcare.org    Information about doulas:  Childbirth collective: http://www.childbirthcollective.org/  Doulas of [place] (STEVE):  www.steve.org  [place]  project: http://twincitiesdoulaproject.com/     Early Childhood and Family Education (ECFE):  ECFE offers parents hands-on learning experiences that will nourish a lifetime of teachable  "moments.  http://ecfe.info/ecfe-home/    March of Dimes www.Sportom[employer]     FDA - Nutrition  www.mypyramid.gov  Under \"For Consumers,\" click on \"pregnant and breastfeeding women.\"      Centers for Disease Control and Prevention (CDC) - Vaccines : http://www.cdc.gov/vaccines/       When researching information on the web, question the validity of websites.  The [employer] .gov, .edu and.org tend to be more reliable information.  If there are a lot of advertisements, be cautious of the information provided. Stay away from blogs and chat rooms please!   "

## 2021-06-22 NOTE — PROGRESS NOTES
CC: The patient is being seen as a consult from Selina Ortiz secondary to a prior  section.    HPI: The pt is a 33 y.o. MWF  at 29 weeks gestation who presents with a history of a  section.  She had her  secondary to fetal intolerance.  She had her  section on 14.  It was a low transverse incision closed in 2 layers.  She had a successful  on 16.  With this pregnancy she would like to have another vaginal delivey.  She is thinking she may want 3-4 children total.  She had the success calculator done which showed a predicted success for  of 87.6%.    Past Medical History:   Diagnosis Date     Anxiety      Depression      Migraine      Varicella        Past Surgical History:   Procedure Laterality Date      SECTION, LOW TRANSVERSE  2014    fetal intolerance; 2 layers       Patient's   Family History   Problem Relation Age of Onset     Colon cancer Maternal Grandmother      Cancer Maternal Grandmother      Alcohol abuse Father      Early death Father      Drug abuse Maternal Uncle      Early death Maternal Grandfather      Cancer Paternal Grandmother        Patient   Social History     Socioeconomic History     Marital status:      Spouse name: None     Number of children: None     Years of education: None     Highest education level: None   Social Needs     Financial resource strain: None     Food insecurity - worry: None     Food insecurity - inability: None     Transportation needs - medical: None     Transportation needs - non-medical: None   Occupational History     None   Tobacco Use     Smoking status: Never Smoker     Smokeless tobacco: Never Used   Substance and Sexual Activity     Alcohol use: No     Drug use: No     Sexual activity: Yes     Partners: Male     Birth control/protection: None   Other Topics Concern     None   Social History Narrative     None       Current Outpatient Medications   Medication Sig Dispense Refill      "cholecalciferol, vitamin D3, 1,000 unit tablet Take 1,000 Units by mouth.       OMEGA-3/DHA/EPA/FISH OIL (FISH OIL HIGH POTENCY ORAL) Take by mouth.       prenatal vitamin iron-folic acid 27mg-0.8mg (PRENATAL S) 27 mg iron- 800 mcg Tab tablet Take 1 tablet by mouth daily.       No current facility-administered medications for this visit.        Patient has No Known Allergies.    ROS:  12 part ROS is negative aside from those symptoms in the HPI    PE:  BP 98/60   Pulse 78   Ht 5' 6.5\" (1.689 m)   Wt 170 lb (77.1 kg)   LMP 2018 (Exact Date)           Body mass index is 27.03 kg/m .    General: WN/WD WF, NAD  Abdomen: gravid  Psych: normal mood  Neuro: CN I-XII grossly intact  MS: normal gait    Assessment: 33 y.o. MWF  at 29 weeks gestation with a prior  section.    Plan: Both vaginal delivery and  delivery risks and benefits were discussed with her.  We discussed what happened with her  and then  and how they can impact this delivery and chance for success for a .  We discussed that I believe her chance at successful vaginal birth is around 90%.  We discussed the approximately 1% risk with either method of delivery, just different risks with each.  We also discussed that the risk involved with a  section is higher for someone who has labored first.   We discussed that with attempt at vaginal delivery, she would need to come to the hospital sooner than if she hadn't had surgery in the past.  We also discussed that induction is avoided if possible, but if it needs to happen because of gestational age or medical issue, Pitocin can be used, but it increases the risk to about 1.9%.  We discussed the recovery with vaginal delivery is usually easier than with  section and usually has a shorter stay in the hospital.  We discussed that epidural as well as other pain medications are still options if she desires with  attempt.  We discussed the benefits to " the baby with vaginal delivery.  We also discussed the risks of surgery, especially as the number of surgeries increases.  We discussed that during the labor process she can change her mind if she wants a repeat  section.  We discussed the hospital stay and recovery limitations, especially with lifting and driving.  We discussed the slightly increased risk of transient tachypnea of the  with  compared to vaginal delivery.  We discussed the timing of scheduled cesareans being no earlier than 39 weeks gestation unless there is some other medical issue that would make delivery earlier necessary.  Consent was signed after all questions were answered.    Approximately 30 minutes were spent with the patient with the majority in counseling.

## 2021-06-23 NOTE — PATIENT INSTRUCTIONS - HE
[facility] Nurse Midwives - Contact information:  Appointment line and to get a hold of CNM in clinic Monday-Friday 8 am - 5 pm:  [phone].  There are some clinics with early start times (1st appointment 7:40 am) and others with evening hours (last appointment 6:20 pm).  Most are typically open from 8 am to 5 pm.    CNM on call answering service: [phone].  Specify your hospital of choice and leave a brief message for CNM;  will then page CNM who is on call at your specified hospital and you should receive a call back with 15 minutes.  Be sure that your ringer is audible and that you can accept blocked calls so that we can get back in touch with you! This number should be reserved for urgent needs if during the day, before 8 am, after 5 pm, weekends, holidays.    Contact the on-call CNM with warning signs, such as:    vaginal bleeding     Vaginal discharge and itching or pain and burning during urination    Leg/calf pain or swelling on one side    severe abdominal pain    nausea and vomiting more than 4-5 times a day, or if you are unable to keep anything down    fever more than 100.4 degrees F.          You are invited to  Meet the [facility] Nurse-Midwives  A way to tour the hospital Labor and Delivery unit and meet the midwives that attend births since you may not have the opportunity to meet them during your prenatal care.  Some sessions are informal meet and greet type social hours, others address a specific concern or topic.    Tuesday, Februrary 12, 2019 7-8pm  [facility]    Wednesday, April 17, 2019 7-8pm  [facility]    Thursday, August 15, 2019 7-8pm  [facility]    Wednesday November 13, 2019 7-8 pm  [facility]    Please call [phone] to register        Touring the Maternity Care Center  To schedule a tour of [facility], call [phone]    To schedule a tour  of [facility], call [phone]      Car Seat Clinics:  https://dps.mn.gov/divisions/ots/child-passenger-safety/Pages/car-seat-checks.aspx  [facility]    Free Car Seat Distribution Facilities   By Appt. Address Contact Information (For appointment)    \Yes [employer]\[address]\[place],\[place]asca)\[email or web address]    Yes [facility]\ [address]\[place],\[place] Amandadaniel Burton)[phone]    Yes [facility]\[address]\[place],\[place] Stephy Campo)[email or web address]      Immunizations:  http://www.cdc.gov/vaccines/schedules/easy-to-read/child.html      Postpartum Depression  The first weeks of caring for a  baby are more than a full-time job. Although it is often a happy time, your feelings and moods may not be what you expected. Many women experience  baby blues.  Here are some tips to help you understand when feelings of sadness are normal, and when you should call your health care provider.    What are the baby blues?  As many as 3 of every 4 women will have short periods of mood swings, crying, or feeling cranky or restless during the first weeks after birth. These feelings can be worse when you are tired or anxious. Women who have the baby blues often say they feel like crying but don t know why. Baby blues usually happen in the first or second week postpartum (after you give birth) and last less than a week. If you are not sleeping, becoming more upset, don t feel like you can take care of your baby, or your sadness lasts 2 weeks or more, call your health care provider.    What is postpartum depression?  About one in every 5 women will develop depression during the first few months postpartum that may be mild, moderate, or severe. Women who have postpartum depression may have some of these symptoms:    Feeling guilty     Not able to enjoy  your baby and feeling like you are not bonding with your baby      Not able to sleep, even when the baby is sleeping    Sleeping too much and feeling too tired to get out of bed    Feeling overwhelmed and not able to do what you need to during the day    Not able to concentrate    Don t feel like eating    Feeling like you are not normal or not yourself anymore    Not able to make decisions    Feeling like a failure as a mother    Feeling lonely or all alone    Thinking your baby might be better off without you  If you have any of these symptoms, call your health care provider!    Which symptoms of postpartum depression are dangerous?  Sometimes a woman with postpartum depression will have thoughts of harming herself or her baby. If you find yourself thinking about hurting yourself or your baby, call your health care provider immediately.    MOTHERHOOD: THE EARLY DAYS  You prepare for the birth of your baby for many months during pregnancy, and then the first months at home after your baby is born can be a quiet, gentle time of getting to know this new person who has come to live in your home. But for most women it is not all quiet or sweet. And for some women it is a very hard time.  What Can I Expect in the First Few Months After the Baby Comes?  New mothers and their families face many challenges in the first few months:    Your body and your hormones have to get back to normal.    You and the baby will be learning to breastfeed.    Babies only sleep a few hours at a time. The entire family will have a hard time getting enough sleep.    You and your family need to learn how to parent this new family member.    If you have a partner, you have to figure out how to stay together as a couple and maybe even start to have sex again.    You may have to figure out how to keep from getting pregnant again right away.    You may need to return to work and find day care.    How Long Will it Take for My Body to Get Back to  Normal?  Some changes will occur quickly. Others will not occur as quickly.    Your uterus, cervix, and vagina will all shrink to their nonpregnant size in about 2 weeks. Your vagina may be tender and dry for a few months--especially if you are breastfeeding.    If you had stitches or hemorrhoids, your   bottom   will be sore for 2 weeks or more.    For some women who have problems urinating, it can take several months for you to be able to hold your urine when you cough or sneeze or suddenly  something heavy.    Your breast milk will   come in   2 to 3 days after the birth of your baby. It will take 6 to 8 weeks for you and the baby to get the hang of breastfeeding and find a pattern. During these first weeks, you can have engorged breasts at times and often leak milk.    Your stomach and intestines all have to fall back into place. You may have a lot of gas for a few weeks.    You may be constipated--especially if you are breastfeeding.    Your stretched stomach muscles can recover in a few weeks, but for some women it takes longer--6 months or a year--to recover.    If you had a  delivery, you may have pain or numbness around the incision for 6 months or more.    Losing the weight you gained during pregnancy will probably take 6 months to a year. Have patience! It took 40 weeks to get here. Give yourself 40 weeks to get back.    What Can I Expect When My Hormones Change?  About 75% of all women will get the   blues.   This usually starts about 3 days after the birth of your baby. You may cry easily and feel very, very tired. A few women become very depressed. If you had a  delivery or your new baby was sick, you are at a higher risk for depression.  Call your health care provider right away if you cannot care for yourself or your baby, if you feel very nervous or worried, if you cannot stop crying, or if you are having thoughts of hurting yourself or your baby.    Taking Care of  Yourself  While you are still pregnant:    Talk with your partner and your family about the time ahead. Arrange for someone to help you during the first weeks at home if you can.    Talk with your health care provider about birth control options and make a plan before the baby comes.    If you are worried about how to parent a , take parenting classes. You will learn a lot about how babies act and you will make some friends who are going through the same thing at the same time. Most [facility] have these classes.    Arrange for someone to help with baby care if you can.  After the baby comes:    Ask for help. Let other people do the cooking and cleaning and run the house. Focus on yourself and your baby.    Sleep whenever you can. Try not to be tempted to   get some things done   when the baby sleeps. This is your time to sleep, too.    Drink lots of water. You will need at least 6 big glasses of water everyday to avoid constipation and make enough breast milk. Every time you sit down to breastfeed, have a big glass of water with you to drink while you are nursing.    Eat lots of vegetables and fruit. You will need lots of vitamins and fiber to help your body get back to normal. This will also help you avoid constipation.    Go outside and walk. Babies can go outside even if it is very cold. Fresh air and sunshine will do you both good.    Take sitz baths. Put about 6 inches of warm water in your bathtub and sit in there for 15 minutes 2 to 3 times a day. This will help your   bottom   heal more quickly. It will also give you 15 minutes of private time!    Talk to other mothers. Join a new parents group. Call Zoë and go to [facility] meetings if you are breastfeeding.     With your partner:    Keep talking. Share the experience.    Spend time alone. Even a 30-minute walk can be a date.    Start a birth control method. You can get pregnant before you even have a period. It is very important to use birth  control if you do not want to get pregnant again right away.    When you have sex, use a lubricant. A lot of lubricant! Take it slow.  The first few months after a baby comes can be a lot like floating in a jar of honey--very sweet and eason, but very sticky, too. Take time to enjoy the good parts. Remind yourself that this time will pass. Bon voyage!    FOR MORE INFORMATION  For questions about depression during and after pregnancy:  http://www.womenshealth.gov/publications/our-publications/fact-sheet/depression-pregnancy.html   After birth: The first 6 weeks:  http://www.[employer]/Post-Birth-and-Recovery   Breastfeeding resources:  http://www.[employer].org/health-info/getting-breastfeeding-off-to-a-good-start/    HEALTHY PREGNANCY CARE: 37 to 41 WEEKS PREGNANT    Talk with your midwife or physician about when to call with signs of labor    Regular uterine contractions that are getting closer together and/or stronger    If you think your water has broken or is leaking    Bleeding from the vagina like a period (bloody vaginal discharge is normal)    If you are not feeling your baby move    Make plans for transportation and  as needed for when you are going to the hospital.    Your midwife or physician may offer to check your cervix for changes.     Ask your health care provider about vaccinations you may need following delivery. By now, you should have received a Tdap immunization to protect against pertussis or whooping cough. Fathers and family members who will be in close contact with the baby should also receive a Tdap shot at least two weeks before the expected birth of the baby if they have not had a Td (tetanus) shot for at least two years.    If you are past your due date, discuss the next steps leading to delivery with your midwife or physician. If you don't start labor on your own by 41 or 42 weeks, your midwife or physician may recommend giving you medicines to ripen your  cervix and start labor.    Preparing for your baby: Tell your midwife or physician how you plan to feed your baby (breast or bottle), who you have chosen to do pediatric care for your baby, and if you have a boy, whether you have chosen to have him circumcised. You will need a car seat correctly installed in your vehicle to bring your baby home. As you start to set up the nursery at home for your baby, make sure the crib is safe. The mattress needs to fit snugly against the edges of the crib. If you can fit a soda can between the bars, they are too far apart and can allow the baby's head to caught between them.    Learn about infant care and feeding, including information about infant CPR. We recommend that you put your baby to sleep on his or her back to reduce the chance of Sudden Infant Death Syndrome (SIDS). To maintain a healthy environment in which your child can grow, it's best to keep your home smoke-free. By preparing ahead, your transition into parenthood will go smoothly for you and your baby.    Your midwife or physician will want to see you for a checkup 2 to 6 weeks after delivery.    If you have questions about any symptoms you are experiencing or any other concerns, call your provider or their clinic staff at [facility] at Phone: [phone]. If it's after clinic hours, physician patients should call the [facility] at [phone] ([phone]); midwife patients should call their answering service at [phone].    How can you care for yourself at home?   You can refer to the Starting Out Right book or find it online at http://www.[facility].org/images/stories/maternity/[facility]-Starting-Out-Right.pdf or http://www.[facility].org/images/stories/flipbooks/[facility]-starting-out-right/[facility]-starting-out-right.html#p=8     You can sign up for a weekly parenting e-mail that gives support, tips and advice from health care professionals that starts with pregnancy and  continues through the toddler years. To register, go to [email or web address] at any time during your pregnancy.        Making Plans for Feeding My Baby    By this point, you probably have read a lot about feeding your baby.  Breastfeed or formula? Each mother s decision is her own and [facility] respects you and your choices. We ve gathered information on both breastfeeding and formula feeding to help with your decision. Talking with your physician or nurse-midwife can also help in your decision.  However you plan to feed your baby, [facility] encourage rooming in with your baby, skin-to-skin contact and feeding your baby based on his or her cues.    Skin-to-skin contact  Being close to mom helps your baby adjust to life outside of the womb.  It helps your baby regulate their body temperature, heart rate, and breathing.  Your baby will usually be placed skin-to-skin immediately following birth or as soon as possible, if medical intervention is needed.    Rooming-In  Having your baby stay with you in your room is called  rooming-in .  Keeping your baby in your room helps you to learn how to care for your baby by getting to know your baby s cues, body rhythms and sleep cycle.       Cue-based feeding  Cues (signals) are baby s way of telling you what he or she wants.  When you learn your infant s cues, you know how to care for and feed your baby.   Feeding cues are the licking and smacking of lips, bringing their fist to their mouth, and a reflex called  rooting - where baby turns and opens his or her mouth, searching for the breast or bottle.  Crying is a late feeding cue.  Babies can feed frequently, often at least 8 times in 24 hours.  Breastfeeding facts  Breast milk is the best source of nutrition for your baby and is available at birth. In the first couple of days, your milk volume is already starting to increase, though it may not be noticeable. Breastfeed frequently to increase your  milk supply. Within three to five days, you will begin to notice larger milk volumes. An increase in breast size, heaviness and firmness are often described as the milk  coming in.  Frequent breastfeeding can help breasts from getting overly firm and painful. You will know the baby is getting enough milk if your baby is having wet and dirty diapers and gaining weight.     If your goal is to exclusively breastfeed, it is important to not use any formula or artificial nipples (including bottles and pacifiers) while your baby is learning to breastfeed.  While it may seem like an  easy  option to give your baby a bottle, formula should only be given if there is a medical reason for your baby to have it.    Positioning and attachment   Get comfortable.  Use pillows as needed to support your arms and baby.  Hold baby close at the level of your breast, facing you in a tummy to tummy position.  Skin to skin helps with this.  Position the baby with his or her nose by the nipple.  There should be a straight line from baby s ear to shoulder to hips.  Tickle your baby s lips or wait for baby to open mouth wide, bring baby to breast by leading with the chin.  Aim the nipple at the roof of baby s mouth.  A rapid sucking pattern is followed by longer, drawing pattern with occasional swallows heard.  When baby is correctly latched, your nipple and much of the areola are pulled well into baby s mouth.      Returning to work or school  Focus on a good start to breastfeeding.  Many women continue to provide breastmilk for their baby when they return to work or school.  Making plans about where to pump and store milk can make the transition go well.  Talk with other mothers who have also returned to work or school for tips and support.  Your employer s Human Resource department may be a resource as well.     Returning to work or school: (continued)     babies can mean fewer  sick  days for you.    A quality breast pump will  also save time and add comfort.  Check with your insurance prior to giving birth for breast pump coverage.  Many insurance companies include a pump within your benefits.    Wait until your baby is at least three weeks old to introduce a bottle for the first time.  Have someone besides you give the bottle.    Breastfeed when you are with your baby. Reserve your bottles of breast milk for when you are away.     Your breasts will need to be  emptied  either by your baby or a pump.  Plan to pump at least twice in an eight hour day.    If you cannot pump at work, continue breastfeeding at home. Any amount of breast milk is worth giving to your baby.    Formula feeding facts  If you are planning to use formula to feed your baby, you will want to make some preparations ahead of time. Talk to your doctor or nurse-midwife about what type of formula to use. Some are iron-fortified, meaning they have extra iron in them. You will want to purchase formula and bottles before your baby is born to be sure you are ready after you return from the hospital. [facility] do not provide formula samples to take home.    Be sure to follow formula mixing directions closely. Regular milk in the dairy case at the grocery store should not be given to babies under 1 year old. Baby formula is sold in several forms including:    Ready-to-use. This is the most expensive, but no mixing is necessary.    Concentrated liquid. This is less expensive than ready-to-use and you mix with water.    Powder. This is the least expensive. You mix one level scoop of powdered formula with two ounces of water and stir well.    Most babies need 2.5 ounces of formula per pound of body weight each day. This means an 8-pound baby may drink about 20 ounces of formula a day; however, this is just an estimate. The most important thing is to pay attention to your baby s cues.  If your baby is always fussy, needs more iron or has certain food allergies, your  physician may suggest you change your baby s formula to a different kind.   How do I warm my baby s bottles?  You may feed your baby a bottle without warming it first. It is OK for the breast milk or formula to be cool or room temperature. If your baby seems to prefer it warmed, you can put the filled bottle in a container of warm water and let it stand for a few minutes. Check the temperature of the liquid on your skin before feeding it to your baby; to be sure it isn t too hot. Do not heat bottles in the microwave. Microwaves heat food and liquids unevenly, and this can cause hot spots that can burn your baby.    How do I clean and sterilize bottles?  Sterilize bottles and nipples before you use them for the first time. You can do this by putting them in boiling water for 5 minutes. After that first time, you can wash them in hot and soapy water. Rinse them carefully to be sure there is no soap left on them. You can also wash them in the .    [facility] [phone] ([phone])    Share with Women\Kuldeep I in Labor?  What is labor? Labor is the work that your body does to birth your baby. Your uterus (the womb) contracts(tightens). The contractions(labor pains) push your baby down onto your cervix(the opening ofyour uterus). Thispressure causesyour cervix to open. When your cervix iscompletely open (10 centimeters dilated), you will push your baby through your vagina and out into the world.  What do contractions feel like? When contractionsfirst start, theyusually feellike cramps duringyour period. Sometimesyoufeelpain in your back. Mostoften,contractions feel like muscles pulling painfully in your lower belly. At first, the contractions will probably be 15 to 20 minutes apart.They maybe irregular and will not feel too painful. As labor goes on, the contractionsget stronger,closer together, more consistent, and more painful.  How do I time the contractions? When the contractionsseem to be coming  regularly, youshould start to time them.You time your contractions by counting the number of minutes from the start of one contraction to the start of the next contraction.  What should I do during early labor when the contractions start? If it is night andyoucan sleep, do so. If it happensduring the day, there are some things you can do to take care of yourself at home: Walk. If the painsyou are having are reallabor, walking will makethecontractionscome closer together and they will be stronger,but you will be able to cope with them better if you are standing or moving around. If the contractions are early labor ones that come andgo (sometimes called false labor), walkingcan make them go away. Take a shower or bath. This will help you relax. Eat. Labor is a big event.Your body needs a lot of energy to be effective.Eat whatever you feel like eating. Drink water. Not drinking enough water can cause contractions to not be as effectiveas theyshould be.You need to be well hydrated (drinking enoughwater) to help your body work well during labor. Take a na p. If youfeel tired, lay down on your side and get all the rest you can. It helps to be rested when you go intoactive labor. Do something you enjoy. Spend time with family. Watch a movie. Distraction will help you relax. Get a massage. If your labor is in your back, a strong massage on your lower back may feel very good. Getting a foot massage or having a partner rub your feet can also be very relaxing. Don t panic. You can do this. Your body was made for this. You are strong!  When should I call my health care provider if I think I am in labor? Your contractions have been 5 minutes or less apart for at least an hour. Your contractions are becoming so painful youcannot walk or talk during one. You think your amniotic sac (bag of petersen) breaks. You may have a big gush of amniotic fluid (water) or just fluid that runs down your legs when you walk or move or change  position.  Are there other reasons to call my health care provider? If you are concerned about anything, don t hesitate to call your health care provider.You should definitely call your health care provider or go to the hospital if:  It is 3 weeks or more before your due date, and you are having contractions.  You have vaginal bleeding that is more than your period, soaks your underwear, or runs down your legs.  You have sudden severe pain that does not go away with rest.  Your baby has not movedfor several hours.  You are leaking greenish fluid.    For More Information: http://onlinelibrary.krause.com/doi/10.1111/jmwh.83979/epdf     [place] Department of Health and Human Services: Signs oflabor,labor stages, and types of birth  http://womenshealth.gov/pregnancy/childbirth-beyond/labor-birth.html#a      Childbirth and Parenting Education:   [facility] center: http://[employer] TRADERLos [facility] CenterWO Funding/   [phone] [phone]-BABY  [place]: (education, yoga & wellness) www.[employer] [place]  Enlightened Mama: www.enlightenedmama.Conjecta   Childbirth collective: (Parent topic nights)  www.childbirthcollective.org/  Hypnobabies:  www.hypnobabiestwincities.com/  Hypnobirthing:  Http://hypnobirthing.com/    Book Recommendations:   Karen Randal's Birthing From Within--first few chapters include a new-age tone, you may prefer to skip it and keep going, because there is good stuff later.  This book recommendation covers emotional preparation, but does cover coping with pain, and use of both pharmacological and nonpharmacological methods.    Dr. Ness' The Pregnancy Book and The Birth Book--the pregnancy book goes month-by month    Womanly Art of Breastfeeding by [employer]   Bestfeeding by Jennifer Blanchard--great pictures    Mothering Your Nursing Toddler, by Roxie Sanchez.   Addresses dealing with so many of the challenging behaviors of a nursing toddler.  How Weaning Happens, by La Leche League.  Discusses weaning at all ages, from  "medically necessary weaning of an infant, all the way up to age 5 (or older), with why/why not, and strategies.  Very empowering book both for deciding to wean and deciding not to.    American College of Nurse-Midwives (ACNM) http://www.midwife.org/; look at the informational handouts at http://www.midwife.org/Share-With-Women     www.mymidwife.org    Mother to Baby (Medication and Herbal guidance in pregnancy): http://www.mothertobaby.org  Toll-Free Hotline: 188.353.6735  LactMed (Medication use while breastfeeding): http://toxnet.nlm.nih.gov/newtoxnet/lactmed.htm    Women's Health.gov:  http://www.womenshealth.gov/a-z-topics/index.html    American pregnancy association - http://americanpregnancy.org    Centering Pregnancy (group prenatal care option): http://centeringhealthcare.org    Information about doulas:  Childbirth collective: http://www.childbirthcollective.org/  [employer] [place] (STEVE):  www.steve.org  [facility]  project: http://twincitiesdoulaproject.com/     Early Childhood and Family Education (ECFE):  ECFE offers parents hands-on learning experiences that will nourish a lifetime of teachable moments.  http://ecfe.info/ecfe-home/    March of D[employer] www.Dualog.SpareTime     FDA - Nutrition  www.mypyramid.gov  Under \"For Consumers,\" click on \"pregnant and breastfeeding women.\"      Centers for Disease Control and Prevention (CDC) - Vaccines : http://www.cdc.gov/vaccines/       When researching information on the web, question the validity of websites.  The domains .gov, .edu and.org tend to be more reliable information.  If there are a lot of advertisements, be cautious of the information provided. Stay away from blogs and chat rooms please!   "

## 2021-06-23 NOTE — PROGRESS NOTES
Christen is here alone today.  She is feeling well.  She had felt more stressed out last week due to her continued for low, however she is more accepting this week.  She is hoping for at least information that she will receive back pain when the government reopens.  She has questions today about presence of hemorrhoids.  She noted that she had them for 3-4 days last week.  We discussed comfort measures for these but also changes in diet and increasing water intake.  Also discussed pregnancy support belt and how that can help take weight off of her pelvic area.  Her EPDS is 7 today.  This might be slightly skewed due to her's recent stress about being unemployed.  She is having active fetal movement and no signs or symptoms of  labor.  Will do GBS, hemoglobin, SVE at next visit.  Hopes to bring her birth plan to the next visit as well.

## 2021-06-23 NOTE — PROGRESS NOTES
Christen is here alone today. Feels well and offers no concerns today. She notes normal FM and no regular UCs. GBS/hgb screen done today. Vertex by leopolds. VE done today and confirms vertex with high fetal presentation, bedside ultrasound confirms vertex; see flow sheet for exam details. We reviewed her birth plan provided; again reviewed recommendation for AMSTL and physiology and outcomes of wait and see method, feels she will decline this as desires to wait and see. Reviewed warning s/sx and reasons to call. RTC 1 week.

## 2021-06-24 NOTE — ANESTHESIA POSTPROCEDURE EVALUATION
Patient: Chrisetn Chappell   SECTION  Anesthesia type: spinal    Patient location: PACU  Last vitals:   Vitals:    19 2315   BP: 110/58   Pulse: 90   Resp: 16   Temp:    SpO2: 99%     Post vital signs: stable  Level of consciousness: awake and responds to simple questions  Post-anesthesia pain: pain controlled  Post-anesthesia nausea and vomiting: no  Pulmonary: unassisted, return to baseline  Cardiovascular: stable and blood pressure at baseline  Hydration: adequate  Anesthetic events: no    QCDR Measures:  ASA# 11 - Kati-op Cardiac Arrest: ASA11B - Patient did NOT experience unanticipated cardiac arrest  ASA# 12 - Kati-op Mortality Rate: ASA12B - Patient did NOT die  ASA# 13 - PACU Re-Intubation Rate: NA - No ETT / LMA used for case  ASA# 10 - Composite Anes Safety: ASA10A - No serious adverse event    Additional Notes:  Large blood loss in [facility], transfused 1 U blood.  Stable post op.  Questions answered, consent obtained.

## 2021-06-24 NOTE — ANESTHESIA PROCEDURE NOTES
Spinal Block    Patient location during procedure: OR  Start time: 2/25/2019 7:50 PM  End time: 2/25/2019 7:53 PM  Reason for block: at surgeon's request and primary anesthetic    Staffing:  Performing  Anesthesiologist: Anum Marx MD    Preanesthetic Checklist  Completed: patient identified, risks, benefits, and alternatives discussed, timeout performed, consent obtained, airway assessed, oxygen available, suction available, emergency drugs available and hand hygiene performed  Spinal Block  Patient position: sitting  Prep: ChloraPrep  Patient monitoring: heart rate, cardiac monitor, continuous pulse ox and blood pressure  Approach: midline  Location: L3-4  Injection technique: single-shot  Needle type: pencil-tip   Needle gauge: 24 G      Additional Notes:  tolerated well, 1st pass

## 2021-06-24 NOTE — ANESTHESIA PREPROCEDURE EVALUATION
Anesthesia Evaluation      Patient summary reviewed   No history of anesthetic complications     Airway   Mallampati: I  Neck ROM: full   Pulmonary - negative ROS and normal exam    breath sounds clear to auscultation                         Cardiovascular - negative ROS  Rhythm: regular  Rate: normal,         Neuro/Psych - negative ROS     Endo/Other    (+) pregnant     GI/Hepatic/Renal - negative ROS           Dental                         Anesthesia Plan  Planned anesthetic: spinal and peripheral nerve block  Patient consented for B TAP blocks per surgeon request for POP  ASA 2 - emergent     Anesthetic plan and risks discussed with: patient and spouse    Post-op plan: routine recovery      Emergency , patient ruptured, 9cm and breech.

## 2021-06-24 NOTE — PROGRESS NOTES
Christen is here alone today.  She is feeling well.  She has questions today about placenta encapsulation.  She did experience what she thinks was likely some postpartum depression more after her second birth than her first.  Compounding this, was the fact that her father passed away during her pregnancy and so she was also grieving at that time.  She would like to avoid those types of feelings in any way she can if possible.  She cannot remember if she saw a therapist at that time or not.  She currently is not seeing one.  She is thinking that placenta encapsulation may be a good option for her.  Resources were given to her regarding this.  After assessing fetal position by Leopold's, bedside ultrasound was used to confirm transverse presentation.  Discussed formal ultrasound and OB consult if fetus is transverse.  At that consult, we discussed external cephalic version versus  section.  Recommended that she go to [employer] website and try techniques to turn a breech baby.  Also recommended chiropractic care.  Urged her to get her ultrasound done as soon as possible so that if fetus is transverse, could get her in for OB consult as soon as possible given her term gestation.  Also reviewed, via my chart, concern for risk of cord prolapse if her water were to break and fetus was not in a vertex presentation.  She is feeling active fetal movement and reports that her baby moves all around all the time.

## 2021-06-24 NOTE — PROGRESS NOTES
Christen is here alone today.  She is feeling well.  Reviewed ultrasound from 2/18/2019 that confirmed vertex presentation.  She reports that after she went home from our visit she did forward leaning inversions 7 times daily and received a massage including abdominal massage on Friday.  She notes that she lost her mucous plug yesterday.  Hoping for labor soon.  Fetus is active.

## 2021-06-24 NOTE — ANESTHESIA CARE TRANSFER NOTE
Last vitals:   Vitals:    02/25/19 2143   BP:    Pulse: 94   Resp: 18   Temp: 36.4  C (97.6  F)     Patient's level of consciousness is alert  Spontaneous respirations: yes  Maintains airway independently: yes  Dentition unchanged: yes  Oropharynx: oropharynx clear of all foreign objects    QCDR Measures:  ASA# 20 - Surgical Safety Checklist: WHO surgical safety checklist completed prior to induction    PQRS# 430 - Adult PONV Prevention: 4558F - Pt received => 2 anti-emetic agents (different classes) preop & intraop  ASA# 8 - Peds PONV Prevention: NA - Not pediatric patient, not GA or 2 or more risk factors NOT present  PQRS# 424 - Kati-op Temp Management: 4559F - At least one body temp DOCUMENTED => 35.5C or 95.9F within required timeframe  PQRS# 426 - PACU Transfer Protocol: - Transfer of care checklist used  ASA# 14 - Acute Post-op Pain: ASA14B - Patient did NOT experience pain >= 7 out of 10

## 2021-06-24 NOTE — TELEPHONE ENCOUNTER
at 39 4/7 weeks calls with report of contractions about every four minutes, for the last hour. Reports that the contractions last 30 to 40 seconds. She does not need to stop to breathe through them and she is able to talk through them. She just got out of the bathtub. She denies leaking of fluid or bleeding. She reports active normal feel movement. She does have history of prior  section, and successful . She is hoping for a natural birth. She has a history of faster labor with her second, but remembers that it was harder to walk and talk through ctx when she came in. They live 15 minutes from the hospital. Recommended that she do light activity, eat what she feels sounds good, stays hydrated, And call back when contractions are consistently one minute long for about an hour. She should also be having more difficulty focusing and needing to stop to breathe through the contractions. She is invited to come in for evaluation at any time that she feels is necessary. She agrees to this.

## 2021-06-24 NOTE — PATIENT INSTRUCTIONS - HE
[facility] Nurse Midwives - Contact information:  Appointment line and to get a hold of CNM in clinic Monday-Friday 8 am - 5 pm:  [phone].  There are some clinics with early start times (1st appointment 7:40 am) and others with evening hours (last appointment 6:20 pm).  Most are typically open from 8 am to 5 pm.    CNM on call answering service: [phone].  Specify your hospital of choice and leave a brief message for CNM;  will then page CNM who is on call at your specified hospital and you should receive a call back with 15 minutes.  Be sure that your ringer is audible and that you can accept blocked calls so that we can get back in touch with you! This number should be reserved for urgent needs if during the day, before 8 am, after 5 pm, weekends, holidays.    Contact the on-call CNM with warning signs, such as:    vaginal bleeding     Vaginal discharge and itching or pain and burning during urination    Leg/calf pain or swelling on one side    severe abdominal pain    nausea and vomiting more than 4-5 times a day, or if you are unable to keep anything down    fever more than 100.4 degrees F.         You are invited to  Meet the [facility] Nurse-Midwives  A way to tour the hospital Labor and Delivery unit and meet the midwives that attend births since you may not have the opportunity to meet them during your prenatal care.  Some sessions are informal meet and greet type social hours, others address a specific concern or topic.    Thursday, Februrary 22, 2018 7-8pm  [facility]  Social Hour    Thursday, April 19, 2018 7-8pm  [facility]  Exercise, nutrition and weight gain    Tuesday, June 28, 2018 7-8pm  [facility]  Postpartum depression/anxiety    Thursday August 23, 2018 7-8 pm  [facility]  Physiology of birth and breastfeeding, Pediatrician presentation    Thursday October 18,  2018  7-8pm,  [facility]  Social Hour    Please call [phone] to register        Touring the Maternity [facility] Center  To schedule a tour of [facility], call [phone]    To schedule a tour of [facility], call [phone]    Childbirth and Parenting Education:   [facility]: [email or web address]   [phone]  [place]: (education, yoga & wellness) www.Nasseo  Enlightened Mama: www.enlightenedmama.Tunespeak   Childbirth collective: (Parent topic nights)  www.childbirthcollective.org/  Hypnobabies:  www.hypnobabiestGreenboxties.com/  Hypnobirthing:  Http://hypnobirthing.com/    Book Recommendations:   Karen Randal's Birthing From Within--first few chapters include a new-age tone, you may prefer to skip it and keep going, because there is good stuff later.  This book recommendation covers emotional preparation, but does cover coping with pain, and use of both pharmacological and nonpharmacological methods.    Dr. Ness' The Pregnancy Book and The Birth Book--the pregnancy book goes month-by month    Womanly Art of Breastfeeding by [employer]eche [employer]   Bestfeeding by Jennifer Blanchard--great pictures    Mothering Your Nursing Toddler, by Roxie Sanchez.   Addresses dealing with so many of the challenging behaviors of a nursing toddler.  How Weaning Happens, by La Leche League.  Discusses weaning at all ages, from medically necessary weaning of an infant, all the way up to age 5 (or older), with why/why not, and strategies.  Very empowering book both for deciding to wean and deciding not to.    American College of Nurse-Midwives (ACNM) http://www.midwife.org/; look at the informational handouts at http://www.midwife.org/Share-With-Women     www.mymidwife.org    Mother to Baby (Medication and Herbal guidance in pregnancy): http://www.mothertobaby.org  Toll-Free Hotline: 754.262.1720  LactMed (Medication use while breastfeeding):  "http://toxnet.nlm.nih.gov/newtoxnet/lactmed.htm    Women's Health.gov:  http://www.womenshealth.gov/a-z-topics/index.html    American pregnancy association - http://americanpregnancy.org    Centering Pregnancy (group prenatal care option): http://centeringhealthcare.org    Information about doulas:  Childbirth collective: http://www.childbirthcollective.org/  Doulas [facility] [place] (STEVE):  www.steve.org  WeiPhone.com [facility]  project: http://twincitiesdoulaproject.com/     Early Childhood and Family Education (ECFE):  ECFE offers parents hands-on learning experiences that will nourish a lifetime of teachable moments.  http://ecfe.info/ecfe-home/     Dim www.FastCall     FDA - Nutrition  www.mypyramid.gov  Under \"For Consumers,\" click on \"pregnant and breastfeeding women.\"      Centers for Disease Control and Prevention (CDC) - Vaccines : http://www.cdc.gov/vaccines/       When researching information on the web, question the validity of websites.  [facility] .gov, .edu and.org tend to be more reliable information.  If there are a lot of advertisements, be cautious of the information provided. Stay away from blogs and chat rooms please!     [place]iro Screening Program  Http://www.health.[facility].mn.us/newbornscreening/  [place] newborns are tested soon after birth for more than 50 hidden, rare disorders, including hearing loss and critical congenital heart disease (CCHD). This site provides resources and information for families and providers.    HEALTHY PREGNANCY CARE: 37 to 41 WEEKS PREGNANT    Talk with your midwife or physician about when to call with signs of labor    Regular uterine contractions that are getting closer together and/or stronger    If you think your water has broken or is leaking    Bleeding from the vagina like a period (bloody vaginal discharge is normal)    If you are not feeling your baby move    Make plans for transportation and  as needed for when you are going to the " hospital.    Your midwife or physician may offer to check your cervix for changes.     Ask your health care provider about vaccinations you may need following delivery. By now, you should have received a Tdap immunization to protect against pertussis or whooping cough. Fathers and family members who will be in close contact with the baby should also receive a Tdap shot at least two weeks before the expected birth of the baby if they have not had a Td (tetanus) shot for at least two years.    If you are past your due date, discuss the next steps leading to delivery with your midwife or physician. If you don't start labor on your own by 41 or 42 weeks, your midwife or physician may recommend giving you medicines to ripen your cervix and start labor.  Induction of labor: http://onlinelibrary.krause.com/store/10.1016/j.jmwh.2008.04.018/asset/j.jmwh.2008.04.018.pdf?v=1&t=fbax2kwp&v=[email or web address]    Preparing for your baby: Tell your midwife or physician how you plan to feed your baby (breast or bottle), who you have chosen to do pediatric care for your baby, and if you have a boy, whether you have chosen to have him circumcised. You will need a car seat correctly installed in your vehicle to bring your baby home. As you start to set up the nursery at home for your baby, make sure the crib is safe. The mattress needs to fit snugly against the edges of the crib. If you can fit a soda can between the bars, they are too far apart and can allow the baby's head to caught between them.    Learn about infant care and feeding, including information about infant CPR. We recommend that you put your baby to sleep on his or her back to reduce the chance of Sudden Infant Death Syndrome (SIDS). To maintain a healthy environment in which your child can grow, it's best to keep your home smoke-free. By preparing ahead, your transition into parenthood will go smoothly for you and your baby.    Your midwife or  physician will want to see you for a checkup 2 to 6 weeks after delivery.    If you have questions about any symptoms you are experiencing or any other concerns, call your provider or their clinic staff at [facility] at Phone: [phone]. If it's after clinic hours, physician patients should call the [facility] at [phone] ([phone]); midwife patients should call their answering service at [phone].    How can you care for yourself at home?   You can refer to the Starting Out Right book or find it online at http://www.healtheast.org/images/stories/maternity/[facility]-Starting-Out-Right.pdf or http://www.healtheast.org/images/stories/flipbooks/[facility]-starting-out-right/[facility]-starting-out-right.html#p=8     You can sign up for a weekly parenting e-mail that gives support, tips and advice from health care professionals that starts with pregnancy and continues through the toddler years. To register, go to www.healtheast.org/baby at any time during your pregnancy.        Making Plans for Feeding My Baby    By this point, you probably have read a lot about feeding your baby.  Breastfeed or formula? Each mother s decision is her own and [facility] respects you and your choices. We ve gathered information on both breastfeeding and formula feeding to help with your decision. Talking with your physician or nurse-midwife can also help in your decision.  However you plan to feed your baby, [facility] encourage rooming in with your baby, skin-to-skin contact and feeding your baby based on his or her cues.    Skin-to-skin contact  Being close to mom helps your baby adjust to life outside of the womb.  It helps your baby regulate their body temperature, heart rate, and breathing.  Your baby will usually be placed skin-to-skin immediately following birth or as soon as possible, if medical intervention is needed.    Rooming-In  Having your baby stay with you in  your room is called  rooming-in .  Keeping your baby in your room helps you to learn how to care for your baby by getting to know your baby s cues, body rhythms and sleep cycle.       Cue-based feeding  Cues (signals) are baby s way of telling you what he or she wants.  When you learn your infant s cues, you know how to care for and feed your baby.   Feeding cues are the licking and smacking of lips, bringing their fist to their mouth, and a reflex called  rooting - where baby turns and opens his or her mouth, searching for the breast or bottle.  Crying is a late feeding cue.  Babies can feed frequently, often at least 8 times in 24 hours.    Breastfeeding facts  Breast milk is the best source of nutrition for your baby and is available at birth. In the first couple of days, your milk volume is already starting to increase, though it may not be noticeable. Breastfeed frequently to increase your milk supply. Within three to five days, you will begin to notice larger milk volumes. An increase in breast size, heaviness and firmness are often described as the milk  coming in.  Frequent breastfeeding can help breasts from getting overly firm and painful. You will know the baby is getting enough milk if your baby is having wet and dirty diapers and gaining weight.     If your goal is to exclusively breastfeed, it is important to not use any formula or artificial nipples (including bottles and pacifiers) while your baby is learning to breastfeed.  While it may seem like an  easy  option to give your baby a bottle, formula should only be given if there is a medical reason for your baby to have it.      Positioning and attachment   Get comfortable.  Use pillows as needed to support your arms and baby.  Hold baby close at the level of your breast, facing you in a tummy to tummy position.  Skin to skin helps with this.  Position the baby with his or her nose by the nipple.  There should be a straight line from baby s ear to  shoulder to hips.  Tickle your baby s lips or wait for baby to open mouth wide, bring baby to breast by leading with the chin.  Aim the nipple at the roof of baby s mouth.  A rapid sucking pattern is followed by longer, drawing pattern with occasional swallows heard.  When baby is correctly latched, your nipple and much of the areola are pulled well into baby s mouth.      Returning to work or school  Focus on a good start to breastfeeding.  Many women continue to provide breastmilk for their baby when they return to work or school.  Making plans about where to pump and store milk can make the transition go well.  Talk with other mothers who have also returned to work or school for tips and support.  Your employer s Human Resource department may be a resource as well.     Returning to work or school: (continued)     babies can mean fewer  sick  days for you.    A quality breast pump will also save time and add comfort.  Check with your insurance prior to giving birth for breast pump coverage.  Many insurance companies include a pump within your benefits.    Wait until your baby is at least three weeks old to introduce a bottle for the first time.  Have someone besides you give the bottle.    Breastfeed when you are with your baby. Reserve your bottles of breast milk for when you are away.     Your breasts will need to be  emptied  either by your baby or a pump.  Plan to pump at least twice in an eight hour day.    If you cannot pump at work, continue breastfeeding at home. Any amount of breast milk is worth giving to your baby.    Formula feeding facts  If you are planning to use formula to feed your baby, you will want to make some preparations ahead of time. Talk to your doctor or nurse-midwife about what type of formula to use. Some are iron-fortified, meaning they have extra iron in them. You will want to purchase formula and bottles before your baby is born to be sure you are ready after you return  from the hospital. [facility] do not provide formula samples to take home.    Be sure to follow formula mixing directions closely. Regular milk in the dairy case at the grocery store should not be given to babies under 1 year old. Baby formula is sold in several forms including:    Ready-to-use. This is the most expensive, but no mixing is necessary.    Concentrated liquid. This is less expensive than ready-to-use and you mix with water.    Powder. This is the least expensive. You mix one level scoop of powdered formula with two ounces of water and stir well.    Most babies need 2.5 ounces of formula per pound of body weight each day. This means an 8-pound baby may drink about 20 ounces of formula a day; however, this is just an estimate. The most important thing is to pay attention to your baby s cues.  If your baby is always fussy, needs more iron or has certain food allergies, your physician may suggest you change your baby s formula to a different kind.   How do I warm my baby s bottles?  You may feed your baby a bottle without warming it first. It is OK for the breast milk or formula to be cool or room temperature. If your baby seems to prefer it warmed, you can put the filled bottle in a container of warm water and let it stand for a few minutes. Check the temperature of the liquid on your skin before feeding it to your baby; to be sure it isn t too hot. Do not heat bottles in the microwave. Microwaves heat food and liquids unevenly, and this can cause hot spots that can burn your baby.    How do I clean and sterilize bottles?  Sterilize bottles and nipples before you use them for the first time. You can do this by putting them in boiling water for 5 minutes. After that first time, you can wash them in hot and soapy water. Rinse them carefully to be sure there is no soap left on them. You can also wash them in the .    [facility] [phone] ([phone])

## 2021-06-24 NOTE — PATIENT INSTRUCTIONS - HE
[facility] Nurse Midwives - Contact information:  Appointment line and to get a hold of CNM in clinic Monday-Friday 8 am - 5 pm:  [phone].  There are some clinics with early start times (1st appointment 7:40 am) and others with evening hours (last appointment 6:20 pm).  Most are typically open from 8 am to 5 pm.    CNM on call answering service: [phone].  Specify your hospital of choice and leave a brief message for CNM;  will then page CNM who is on call at your specified hospital and you should receive a call back with 15 minutes.  Be sure that your ringer is audible and that you can accept blocked calls so that we can get back in touch with you! This number should be reserved for urgent needs if during the day, before 8 am, after 5 pm, weekends, holidays.    Contact the on-call CNM with warning signs, such as:    vaginal bleeding     Vaginal discharge and itching or pain and burning during urination    Leg/calf pain or swelling on one side    severe abdominal pain    nausea and vomiting more than 4-5 times a day, or if you are unable to keep anything down    fever more than 100.4 degrees F.         You are invited to  Meet the [facility] Nurse-Midwives  A way to tour the hospital Labor and Delivery unit and meet the midwives that attend births since you may not have the opportunity to meet them during your prenatal care.  Some sessions are informal meet and greet type social hours, others address a specific concern or topic.    Thursday, Februrary 22, 2018 7-8pm  [facility]  Social Hour    Thursday, April 19, 2018 7-8pm  [facility], [facility]  Exercise, nutrition and weight gain    Tuesday, June 28, 2018 7-8pm  [facility]  Postpartum depression/anxiety    Thursday August 23, 2018 7-8 pm  [facility], [facility]  Physiology of birth and breastfeeding, Pediatrician presentation    Thursday October 18,  2018  7-8pm,  [facility], [facility]  Social Hour    Please call [phone] to register        Touring the Maternity Care Center  To schedule a tour of [facility], call [phone]    To schedule a tour of [facility], call [phone]    Childbirth and Parenting Education:   [facility]: http://[facility] CenterAsempra Technologies/   [phone]  [place]: (education, yoga & wellness) www.Soundl.ly  Enlightened Mama: www.enlightenedmama.[employer]   Childbirth collective: (Parent topic nights)  www.childbirthcollective.org/  Hypnobabies:  www.hypnobabiestPI Corporationties.com/  Hypnobirthing:  Http://hypnobirthing.com/    Book Recommendations:   Karen Randal's Birthing From Within--first few chapters include a new-age tone, you may prefer to skip it and keep going, because there is good stuff later.  This book recommendation covers emotional preparation, but does cover coping with pain, and use of both pharmacological and nonpharmacological methods.    Dr. Ness' The Pregnancy Book and The Birth Book--the pregnancy book goes month-by month    Womanly Art of Breastfeeding by La Leche [employer]   Bestfeeding by Jennifer Blanchard--great pictures    Mothering Your Nursing Toddler, by Roxie Sanchez.   Addresses dealing with so many of the challenging behaviors of a nursing toddler.  How Weaning Happens, by La Leche League.  Discusses weaning at all ages, from medically necessary weaning of an infant, all the way up to age 5 (or older), with why/why not, and strategies.  Very empowering book both for deciding to wean and deciding not to.    American College of Nurse-Midwives (ACNM) http://www.midwife.org/; look at the informational handouts at http://www.midwife.org/Share-With-Women     www.mymidwife.org    Mother to Baby (Medication and Herbal guidance in pregnancy): http://www.mothertobaby.org  Toll-Free Hotline: 426.488.5229  LactMed (Medication use while breastfeeding):  "http://toxnet.nlm.nih.gov/newtoxnet/lactmed.htm    Women's Health.gov:  http://www.womenshealth.gov/a-z-topics/index.html    American pregnancy association - http://americanpregnancy.org    Centering Pregnancy (group prenatal care option): http://centeringhealthcare.org    Information about doulas:  Childbirth collective: http://www.childbirthcollective.org/  Doulas [employer] [place] (STEVE):  www.steve.org  SPARQ [facility]  project: http://twincitiesdoulaproject.com/     Early Childhood and Family Education (ECFE):  ECFE offers parents hands-on learning experiences that will nourish a lifetime of teachable moments.  http://ecfe.info/ecfe-home/     Dim www.SafetyTat     FDA - Nutrition  www.mypyramid.gov  Under \"For Consumers,\" click on \"pregnant and breastfeeding women.\"      Centers for Disease Control and Prevention (CDC) - Vaccines : http://www.cdc.gov/vaccines/       When researching information on the web, question the validity of websites.  The [employer]ii[employer]i .gov, .edu and.org tend to be more reliable information.  If there are a lot of advertisements, be cautious of the information provided. Stay away from blogs and chat rooms please!     [place] Screening Program  Http://www.health.[facility].mn.us/newbornscreening/  [place] newborns are tested soon after birth for more than 50 hidden, rare disorders, including hearing loss and critical congenital heart disease (CCHD). This site provides resources and information for families and providers.    HEALTHY PREGNANCY CARE: 37 to 41 WEEKS PREGNANT    Talk with your midwife or physician about when to call with signs of labor    Regular uterine contractions that are getting closer together and/or stronger    If you think your water has broken or is leaking    Bleeding from the vagina like a period (bloody vaginal discharge is normal)    If you are not feeling your baby move    Make plans for transportation and  as needed for when you are going to the " hospital.    Your midwife or physician may offer to check your cervix for changes.     Ask your health care provider about vaccinations you may need following delivery. By now, you should have received a Tdap immunization to protect against pertussis or whooping cough. Fathers and family members who will be in close contact with the baby should also receive a Tdap shot at least two weeks before the expected birth of the baby if they have not had a Td (tetanus) shot for at least two years.    If you are past your due date, discuss the next steps leading to delivery with your midwife or physician. If you don't start labor on your own by 41 or 42 weeks, your midwife or physician may recommend giving you medicines to ripen your cervix and start labor.  Induction of labor: http://onlinelibrary.krause.com/store/10.1016/j.jmwh.2008.04.018/asset/j.jmwh.2008.04.018.pdf?v=1&t=rdot9vgx&a=[email or web address]    Preparing for your baby: Tell your midwife or physician how you plan to feed your baby (breast or bottle), who you have chosen to do pediatric care for your baby, and if you have a boy, whether you have chosen to have him circumcised. You will need a car seat correctly installed in your vehicle to bring your baby home. As you start to set up the nursery at home for your baby, make sure the crib is safe. The mattress needs to fit snugly against the edges of the crib. If you can fit a soda can between the bars, they are too far apart and can allow the baby's head to caught between them.    Learn about infant care and feeding, including information about infant CPR. We recommend that you put your baby to sleep on his or her back to reduce the chance of Sudden Infant Death Syndrome (SIDS). To maintain a healthy environment in which your child can grow, it's best to keep your home smoke-free. By preparing ahead, your transition into parenthood will go smoothly for you and your baby.    Your midwife or  physician will want to see you for a checkup 2 to 6 weeks after delivery.    If you have questions about any symptoms you are experiencing or any other concerns, call your provider or their clinic staff at [facility] at Phone: [phone]. If it's after clinic hours, physician patients should call the [facility] at [phone] ([phone]); midwife patients should call their answering service at [phone].    How can you care for yourself at home?   You can refer to the Starting Out Right book or find it online at http://www.healtheast.org/images/stories/maternity/[facility]-Starting-Out-Right.pdf or http://www.healtheast.org/images/stories/flipbooks/[facility]-starting-out-right/[facility]-starting-out-right.html#p=8     You can sign up for a weekly parenting e-mail that gives support, tips and advice from health care professionals that starts with pregnancy and continues through the toddler years. To register, go to www.healtheast.org/baby at any time during your pregnancy.        Making Plans for Feeding My Baby    By this point, you probably have read a lot about feeding your baby.  Breastfeed or formula? Each mother s decision is her own and [facility] respects you and your choices. We ve gathered information on both breastfeeding and formula feeding to help with your decision. Talking with your physician or nurse-midwife can also help in your decision.  However you plan to feed your baby, [facility] encourage rooming in with your baby, skin-to-skin contact and feeding your baby based on his or her cues.    Skin-to-skin contact  Being close to mom helps your baby adjust to life outside of the womb.  It helps your baby regulate their body temperature, heart rate, and breathing.  Your baby will usually be placed skin-to-skin immediately following birth or as soon as possible, if medical intervention is needed.    Rooming-In  Having your baby stay with you in  your room is called  rooming-in .  Keeping your baby in your room helps you to learn how to care for your baby by getting to know your baby s cues, body rhythms and sleep cycle.       Cue-based feeding  Cues (signals) are baby s way of telling you what he or she wants.  When you learn your infant s cues, you know how to care for and feed your baby.   Feeding cues are the licking and smacking of lips, bringing their fist to their mouth, and a reflex called  rooting - where baby turns and opens his or her mouth, searching for the breast or bottle.  Crying is a late feeding cue.  Babies can feed frequently, often at least 8 times in 24 hours.    Breastfeeding facts  Breast milk is the best source of nutrition for your baby and is available at birth. In the first couple of days, your milk volume is already starting to increase, though it may not be noticeable. Breastfeed frequently to increase your milk supply. Within three to five days, you will begin to notice larger milk volumes. An increase in breast size, heaviness and firmness are often described as the milk  coming in.  Frequent breastfeeding can help breasts from getting overly firm and painful. You will know the baby is getting enough milk if your baby is having wet and dirty diapers and gaining weight.     If your goal is to exclusively breastfeed, it is important to not use any formula or artificial nipples (including bottles and pacifiers) while your baby is learning to breastfeed.  While it may seem like an  easy  option to give your baby a bottle, formula should only be given if there is a medical reason for your baby to have it.      Positioning and attachment   Get comfortable.  Use pillows as needed to support your arms and baby.  Hold baby close at the level of your breast, facing you in a tummy to tummy position.  Skin to skin helps with this.  Position the baby with his or her nose by the nipple.  There should be a straight line from baby s ear to  shoulder to hips.  Tickle your baby s lips or wait for baby to open mouth wide, bring baby to breast by leading with the chin.  Aim the nipple at the roof of baby s mouth.  A rapid sucking pattern is followed by longer, drawing pattern with occasional swallows heard.  When baby is correctly latched, your nipple and much of the areola are pulled well into baby s mouth.      Returning to work or school  Focus on a good start to breastfeeding.  Many women continue to provide breastmilk for their baby when they return to work or school.  Making plans about where to pump and store milk can make the transition go well.  Talk with other mothers who have also returned to work or school for tips and support.  Your employer s Human Resource department may be a resource as well.     Returning to work or school: (continued)     babies can mean fewer  sick  days for you.    A quality breast pump will also save time and add comfort.  Check with your insurance prior to giving birth for breast pump coverage.  Many insurance companies include a pump within your benefits.    Wait until your baby is at least three weeks old to introduce a bottle for the first time.  Have someone besides you give the bottle.    Breastfeed when you are with your baby. Reserve your bottles of breast milk for when you are away.     Your breasts will need to be  emptied  either by your baby or a pump.  Plan to pump at least twice in an eight hour day.    If you cannot pump at work, continue breastfeeding at home. Any amount of breast milk is worth giving to your baby.    Formula feeding facts  If you are planning to use formula to feed your baby, you will want to make some preparations ahead of time. Talk to your doctor or nurse-midwife about what type of formula to use. Some are iron-fortified, meaning they have extra iron in them. You will want to purchase formula and bottles before your baby is born to be sure you are ready after you return  from the hospital. [facility] do not provide formula samples to take home.    Be sure to follow formula mixing directions closely. Regular milk in the dairy case at the grocery store should not be given to babies under 1 year old. Baby formula is sold in several forms including:    Ready-to-use. This is the most expensive, but no mixing is necessary.    Concentrated liquid. This is less expensive than ready-to-use and you mix with water.    Powder. This is the least expensive. You mix one level scoop of powdered formula with two ounces of water and stir well.    Most babies need 2.5 ounces of formula per pound of body weight each day. This means an 8-pound baby may drink about 20 ounces of formula a day; however, this is just an estimate. The most important thing is to pay attention to your baby s cues.  If your baby is always fussy, needs more iron or has certain food allergies, your physician may suggest you change your baby s formula to a different kind.   How do I warm my baby s bottles?  You may feed your baby a bottle without warming it first. It is OK for the breast milk or formula to be cool or room temperature. If your baby seems to prefer it warmed, you can put the filled bottle in a container of warm water and let it stand for a few minutes. Check the temperature of the liquid on your skin before feeding it to your baby; to be sure it isn t too hot. Do not heat bottles in the microwave. Microwaves heat food and liquids unevenly, and this can cause hot spots that can burn your baby.    How do I clean and sterilize bottles?  Sterilize bottles and nipples before you use them for the first time. You can do this by putting them in boiling water for 5 minutes. After that first time, you can wash them in hot and soapy water. Rinse them carefully to be sure there is no soap left on them. You can also wash them in the .    [facility] [phone] ([phone])

## 2021-06-24 NOTE — ANESTHESIA PROCEDURE NOTES
Peripheral Block    Patient location during procedure: OR  Start time: 2/25/2019 9:17 PM  End time: 2/25/2019 9:22 PM  post-op analgesia per surgeon order as noted in medical record  Staffing:  Performing  Anesthesiologist: Anum Marx MD  Preanesthetic Checklist  Completed: patient identified, site marked, risks, benefits, and alternatives discussed, timeout performed, consent obtained, airway assessed, oxygen available, suction available, emergency drugs available and hand hygiene performed  Peripheral Block  Block type: other, TAP  Prep: ChloraPrep  Patient position: supine  Patient monitoring: cardiac monitor, continuous pulse oximetry, heart rate and blood pressure  Laterality: bilateral  Injection technique: ultrasound guided    Ultrasound used to visualize needle placement in proximity to nerve being blocked: yes   Permanent ultrasound image captured for medical record  Sterile gel and probe cover used for ultrasound.    Needle  Needle type: Stimuplex   Needle gauge: 20G  Needle length: 4 in  no peripheral nerve catheter placed  Assessment  Injection assessment: no difficulty with injection, negative aspiration for heme, no paresthesia on injection and incremental injection  Additional Notes  Bilateral TAP blocks, 25 ml 0.25% bup with 1/200k epi on both sides.

## 2021-06-25 NOTE — PROGRESS NOTES
Progress Notes by Magen Birch DO at 3/19/2017  1:00 PM     Author: Magen Birch DO Service: -- Author Type: Physician    Filed: 3/20/2017  1:22 AM Encounter Date: 3/19/2017 Status: Signed    : Magen Birch DO (Physician)       Chief Complaint   Patient presents with   ? Sore Throat     swollen glands, cold Sx x 10 days        History of Present Illness: Nursing notes reviewed. Patient had an increase the past day of her throat discomfort she has had for about 10 days. She has been more nasally congested the past 10 days.     Review of systems: See history of present illness, otherwise negative.     Current Outpatient Prescriptions   Medication Sig Dispense Refill   ? cholecalciferol, vitamin D3, 1,000 unit tablet Take 1,000 Units by mouth.     ? fluticasone (FLONASE) 50 mcg/actuation nasal spray 1 spray into each nostril daily.     ? OMEGA-3/DHA/EPA/FISH OIL (FISH OIL HIGH POTENCY ORAL) Take by mouth.     ? prenatal vitamin iron-folic acid 27mg-0.8mg (PRENATAL S) 27 mg iron- 800 mcg Tab tablet Take 1 tablet by mouth.     ? amoxicillin-clavulanate (AUGMENTIN) 875-125 mg per tablet Take 1 tablet by mouth 2 (two) times a day for 10 days. 20 tablet 0   ? prenatal vitamin iron-folic acid 27mg-0.8mg (PRENATAL S) 27 mg iron- 800 mcg Tab tablet Take 1 tablet by mouth daily.       No current facility-administered medications for this visit.        Past Medical History:   Diagnosis Date   ? Abnormal Pap smear of cervix    ? Mental disorder     generalized anxiety      No past surgical history on file.   Social History     Social History   ? Marital status:      Spouse name: N/A   ? Number of children: N/A   ? Years of education: N/A     Social History Main Topics   ? Smoking status: Never Smoker   ? Smokeless tobacco: None   ? Alcohol use No   ? Drug use: No   ? Sexual activity: Yes     Partners: Male     Other Topics Concern   ? None     Social History Narrative       History   Smoking Status   ? Never  Smoker   Smokeless Tobacco   ? Not on file      Exam:   Blood pressure 94/62, pulse 81, temperature 98.5  F (36.9  C), temperature source Oral, resp. rate 14, weight 153 lb 6.4 oz (69.6 kg), SpO2 100 %, currently breastfeeding.    EXAM:   General: Vital signs reviewed. Patient is in no acute appearing distress. Breathing is non labored appearing. Patient is alert and oriented x 3.   ENT: Ear exam shows clear TMs with no injection, nasal turbinates are injected and edematous with increased purulent rhinorrhea, there is moderate pharyngeal injection with exudative tonsilitis noted, with tonsils being about 2 + size bilaterally.  Neck: supple with tender adenopathy  Heart: Normal rate and rhythm without murmur  Lungs: Clear to auscultation with good air flow bilaterally.  Skin: warm and dry    Recent Results (from the past 24 hour(s))   Rapid Strep A Screen-Throat   Result Value Ref Range    Rapid Strep A Antigen Group A Strep detected (!) No Group A Strep detected    Results from exam reviewed with patient.    Assessment/Plan   1. Throat pain  Rapid Strep A Screen-Throat   2. Strep pharyngitis  amoxicillin-clavulanate (AUGMENTIN) 875-125 mg per tablet   3. Sinusitis  amoxicillin-clavulanate (AUGMENTIN) 875-125 mg per tablet       Patient Instructions     Also see info below. Be seen again in 4-5 days if symptoms are not better, sooner if feeling any worse.    Self-Care for Sore Throats  Sore throats occur for many reasons, such as colds, allergies, and infections caused by viruses or bacteria. In any case, your throat becomes red and sore. Your goal for self-care is to reduce your discomfort while giving your throat a chance to heal.    Moisten and Soothe Your Throat    Try a sip of water first thing after waking up.    Keep your throat moist by drinking 6 or more glasses of clear liquids every day.    Run a cool-air humidifier in your room overnight.    Avoid cigarette smoke.     Suck on throat lozenges, cough drops,  hard candy, ice chips, or frozen fruit-juice bars. Use the sugar-free versions if your diet or medical condition require them.  Gargle to Ease Irritation  Gargling every hour or 2 can ease irritation. Try gargling with 1 of these solutions:    1/4 teaspoon of salt in 1/2 cup of warm water    An over-the-counter anesthetic gargle  Use Medication for More Relief  Over-the-counter medication can reduce sore throat symptoms. Ask your pharmacist if you have questions about which medication to use:    Ease pain with anesthetic sprays. Aspirin or an aspirin substitute also helps. Remember, never give aspirin to anyone 18 or younger, or if you are already taking blood thinners.     For sore throats caused by allergies, try antihistamines to block the allergic reaction.    Remember: unless a sore throat is caused by a bacterial infection, antibiotics wont help you.  Prevent Future Sore Throats    Stop smoking or reduce contact with secondhand smoke. Smoke irritates the tender throat lining.    Limit contact with pets and with allergy-causing substances, such as pollen and mold.    When youre around someone with a sore throat or cold, wash your hands frequently to keep viruses or bacteria from spreading.    Dont strain your vocal cords.  Call Your Health Care Provider  Contact your doctor if you have:    A temperature over 101 F (38.3 C)    White spots on the throat    Great difficulty swallowing    Trouble breathing    A skin rash    Recent exposure to someone else with strep bacteria    Severe hoarseness and swollen glands in the neck or jaw     5340-4953 [employer]. [address] [place] [address], [place] [address]. All rights reserved. This information is not intended as a substitute for professional medical care. Always follow your healthcare professional's instructions.        Acute Bacterial Rhinosinusitis (ABRS)  Acute bacterial rhinosinusitis (ABRS) is an infection of your nasal cavity and sinuses. Its caused  by bacteria. Acute means that youve had symptoms for less than 12 weeks.  Understanding your sinuses  The nasal cavity is the large air-filled space behind your nose. The sinuses are a group of spaces formed by the bones of your face. They connect with your nasal cavity. ABRS causes the tissue lining these spaces to become inflamed. Mucus may not drain normally. This leads to facial pain and other symptoms.  What causes ABRS?  ABRS most often follows an upper respiratory infection caused by a virus. Bacteria then infect the lining of your nasal cavity and sinuses. But you can also get ABRS if you have:    Nasal allergies    Long-term nasal swelling and congestion not caused by allergies    Blockage in the nose  Symptoms of ABRS  The symptoms of ABRS may be different for each person, and can include:    Nasal congestion    Runny nose    Fluid draining from the nose down the throat (postnasal drip)    Headache    Cough    Pain in the sinuses    Thick, colored fluid from the nose (mucus)    Fever  Diagnosing ABRS  ABRS may be diagnosed if youve had an upper respiratory infection like a cold and cough for longer than 10 to 14 days. Your health care provider will ask about your symptoms and your medical history. The provider will check your vital signs, including your temperature. Youll have a physical exam. The health care provider will check your ears, nose, and throat. You likely wont need any tests. If ABRS comes back, you may have a culture or other tests.  Treatment for ABRS  Treatment may include:    Antibiotic medicine. This is for symptoms that last for at least 10 to 14 days.    Nasal corticosteroid medicine. Drops or spray used in the nose can lessen swelling and congestion.    Over-the-counter pain medicine. This is to lessen sinus pain and pressure.    Nasal decongestant medicine. Spray or drops may help to lessen congestion. Do not use them for more than a few days.    Salt wash (saline irrigation). This  can help to loosen mucus.  Possible complications of ABRS  ABRS may come back or become long-term (chronic).  In rare cases, ABRS may cause complications such as:     Inflamed tissue around the brain and spinal cord (meningitis)    Inflamed tissue around the eyes (orbital cellulitis)    Inflamed bones around the sinuses (osteitis)  These problems may need to be treated in a hospital with intravenous (IV) antibiotic medicine or surgery.  When to call the health care provider  Call your health care provider if you have any of the following:    Symptoms that dont get better, or get worse    Symptoms that dont get better after 3 to 5 days on antibiotics    Trouble seeing    Swelling around your eyes    Confusion or trouble staying awake        8175-0737 [employer]. [address] [place] [address], [place] [address]. All rights reserved. This information is not intended as a substitute for professional medical care. Always follow your healthcare professional's instructions.           Magen Birch, DO

## 2021-06-26 ENCOUNTER — HEALTH MAINTENANCE LETTER (OUTPATIENT)
Age: 36
End: 2021-06-26

## 2021-06-28 NOTE — PROGRESS NOTES
"Progress Notes by Justus Castro PA-C at 1/11/2020  8:10 AM     Author: Justus Castro PA-C Service: -- Author Type: Physician Assistant    Filed: 1/11/2020  3:12 PM Encounter Date: 1/11/2020 Status: Addendum    : Justus Castro PA-C (Physician Assistant)    Related Notes: Original Note by Justus Castro PA-C (Physician Assistant) filed at 1/11/2020  2:58 PM       Subjective:      Patient ID: Christen Chappell is a 34 y.o. female.    Chief Complaint:    HPI     Christen Chappell is a 34 y.o. female who is currently breast feeding who presents today complaining of a 2-day history of rectal bleeding.  Patient states that she has had a bowel movement yesterday and she had blood on the stool and in the toilet bowl.  When she performed her hygiene she had streaks of blood on the toilet tissue.  She did not have jono rectal bleeding in the intervals between her defecation.  She did not have gross rectal bleeding this morning because she did not have a bowel movement.  There is been no staining or discharge into her undergarments from yesterday until today.    Patient is currently breast-feeding and states that she is drinking \"plenty of fluids\". She states she is drinking and micturating.  She has had 2 20 oz bottles of water while in the clinic this AM.    Patient is complaining that she does feel dizzy.  She has had no presyncopal syncopal vomiting diarrhea, abdominal pain pelvic pain heartburn cough sore throat urinary flank or back pain to report.  Patient states she does not have flulike symptoms other than fatigue and dizziness.     She is currently breast-feeding but states that she has been keeping up on her fluid status.    Her last bowel movement was a hard formed stool yesterday which coincided with her rectal bleeding.  However, she has not had continued rectal pain and no continued discharge from the anus or anal leakage.  Subsequently she did not have another bowel movement today.    Lastly, patient " denies pregnancy.  She states that she uses condoms for barrier method for contraception.  Patient does not have other forms of birth control that she uses.  She is currently breast-feeding.  She states that she is only had 5 menses in the last 5 years because of either pregnancy or breast-feeding or both.  She has not had any difficulty with either recurrent or heavy menses.  She is not having her menses at this time.      Past Medical History:   Diagnosis Date   ? Anxiety    ? Depression    ? Migraine    ? Varicella        Past Surgical History:   Procedure Laterality Date   ?  SECTION Bilateral 2019    Procedure:  SECTION;  Surgeon: Viktoriya Oconnell MD;  Location: [facility];  Service: Obstetrics   ?  SECTION, LOW TRANSVERSE  2014    fetal intolerance; 2 layers       Family History   Problem Relation Age of Onset   ? Colon cancer Maternal Grandmother    ? Cancer Maternal Grandmother    ? Alcohol abuse Father    ? Early death Father    ? Drug abuse Maternal Uncle    ? Early death Maternal Grandfather    ? Cancer Paternal Grandmother        Social History     Tobacco Use   ? Smoking status: Never Smoker   ? Smokeless tobacco: Never Used   Substance Use Topics   ? Alcohol use: No   ? Drug use: No       Review of Systems   Constitutional: Positive for fatigue. Negative for appetite change, chills, diaphoresis, fever and unexpected weight change.   HENT: Negative for congestion, ear discharge, ear pain, facial swelling, hearing loss, rhinorrhea, sinus pain and sore throat.    Eyes: Negative.    Respiratory: Negative for cough, chest tightness and shortness of breath.    Cardiovascular: Negative for chest pain and palpitations.   Gastrointestinal: Positive for anal bleeding, blood in stool, constipation and nausea. Negative for abdominal distention, abdominal pain, diarrhea, rectal pain and vomiting.   Endocrine: Negative for polydipsia, polyphagia and polyuria.   Genitourinary:  Negative for difficulty urinating, dysuria, hematuria, pelvic pain, vaginal bleeding and vaginal discharge.   Musculoskeletal: Negative for arthralgias, back pain, myalgias, neck pain and neck stiffness.   Skin: Negative for rash.   Neurological: Positive for dizziness. Negative for tremors, syncope, speech difficulty, weakness and headaches.   Psychiatric/Behavioral: Negative for confusion.     As above in HPI, otherwise balance of Review of Systems are negative.    Objective:     BP 99/63 (Patient Site: Right Arm, Patient Position: Sitting, Cuff Size: Adult Regular)   Pulse 66   Temp 97.7  F (36.5  C) (Oral)   Resp 18   Wt 162 lb (73.5 kg)   SpO2 100%   Breastfeeding Yes   BMI 25.76 kg/m       Vitals:    01/11/20 0828 01/11/20 1135   BP: 122/84 99/63   Patient Site: Right Arm Right Arm   Patient Position: Sitting Sitting   Cuff Size: Adult Regular Adult Regular   Pulse: 72 66   Resp: 18    Temp: 97.5  F (36.4  C) 97.7  F (36.5  C)   TempSrc: Oral Oral   SpO2: 100% 100%   Weight: 162 lb (73.5 kg)        Physical Exam  General: Patient is resting comfortably no acute distress is afebrile  She does not appear acutely ill toxic dehydrated or febrile.  She is speaking full sentences is not short of breath.  She is interacting with the provider.  HEENT: Head is normocephalic atraumatic   eyes are PERRL EOMI sclera anicteric   TMs are clear bilaterally  Throat is without erythema and no exudate  No cervical lymphadenopathy present  LUNGS: Clear to auscultation bilaterally  HEART: Regular rate and rhythm  Abdomen: Nontender nondistended no rebound or guarding no masses no CVA tenderness to percussion no suprapubic tenderness to palpation  Skin: Without rash non-diaphoretic oral mucous membranes are moist conjunctiva without pallor and capillary refill is less than 2 seconds.  Neuro: Cranial nerves II through XII are grossly intact.  Bilateral upper lower extremities are 5 out of 5 and equal bilaterally with  strength.  Romberg is negative.  Both direct and consensual stimulation to light and there is no noted nystagmus.  Rectal: Under supervised chaperoned exam with our MA in the office, patient was placed in the Euceda position and a rectal exam was done.  I did see that there is internal hemorrhoid but did not see a fissure or fistula.  There was stool in the rectal vault that appeared to be hard and formed.  Hemoccult testing was obtained from the stool.  Patient tolerated entire procedure well.    The visit lasted a total of 60 minutes that I spent face to face with the patient out of a 100 minute office visit. Over 50% of the time was spent counseling, coordinating care, reviewing pathophysiology and treatment options and educating the patient about the management plan.      Lab:  Recent Results (from the past 24 hour(s))   HM1 (CBC with Diff)   Result Value Ref Range    WBC 5.1 4.0 - 11.0 thou/uL    RBC 4.18 3.80 - 5.40 mill/uL    Hemoglobin 12.0 12.0 - 16.0 g/dL    Hematocrit 36.9 35.0 - 47.0 %    MCV 88 80 - 100 fL    MCH 28.8 27.0 - 34.0 pg    MCHC 32.6 32.0 - 36.0 g/dL    RDW 11.8 11.0 - 14.5 %    Platelets 204 140 - 440 thou/uL    MPV 7.6 7.0 - 10.0 fL    Neutrophils % 58 50 - 70 %    Lymphocytes % 33 20 - 40 %    Monocytes % 7 2 - 10 %    Eosinophils % 2 0 - 6 %    Basophils % 0 0 - 2 %    Neutrophils Absolute 3.0 2.0 - 7.7 thou/uL    Lymphocytes Absolute 1.7 0.8 - 4.4 thou/uL    Monocytes Absolute 0.3 0.0 - 0.9 thou/uL    Eosinophils Absolute 0.1 0.0 - 0.4 thou/uL    Basophils Absolute 0.0 0.0 - 0.2 thou/uL   Occult Blood(ICT)   Result Value Ref Range    Fecal Occult Bld (ICT) 1 Positive (!) Negative   ISTAT CHEM 7 ([facility] ONLY)   Result Value Ref Range    Sodium 137 136 - 145 mmol/L    Potassium 4.2 3.5 - 5.5 mmol/L    CO2 26 22 - 31 mmol/L    Chloride 103 98 - 107 mmol/L    Anion Gap, Calculation 8 5 - 18 mmol/L    Glucose 96 70 - 125 mg/dL    BUN 21 8 - 22 mg/dL    Creatinine 0.60 (L) 0.70 - 1.30 mg/dL    Urinalysis-UC if Indicated   Result Value Ref Range    Color, UA Yellow Colorless, Yellow, Straw, Light Yellow    Clarity, UA Clear Clear    Glucose, UA Negative Negative    Bilirubin, UA Negative Negative    Ketones, UA Negative Negative    Specific Gravity, UA 1.025 1.005 - 1.030    Blood, UA Negative Negative    pH, UA 6.5 5.0 - 8.0    Protein, UA Negative Negative mg/dL    Urobilinogen, UA 0.2 E.U./dL 0.2 E.U./dL, 1.0 E.U./dL    Nitrite, UA Negative Negative    Leukocytes, UA Negative Negative   Pregnancy (Beta-hCG, Qual), Urine   Result Value Ref Range    Pregnancy Test, Urine Negative Negative   Rapid Strep A Screen-Throat   Result Value Ref Range    Rapid Strep A Antigen No Group A Strep detected, presumptive negative No Group A Strep detected, presumptive negative   Influenza A/B Rapid Test   Result Value Ref Range    Influenza  A, Rapid Antigen No Influenza A antigen detected No Influenza A antigen detected    Influenza B, Rapid Antigen No Influenza B antigen detected No Influenza B antigen detected   Electrocardiogram Perform - Clinic   Result Value Ref Range    SYSTOLIC BLOOD PRESSURE      DIASTOLIC BLOOD PRESSURE      VENTRICULAR RATE 52 BPM    ATRIAL RATE 52 BPM    P-R INTERVAL 164 ms    QRS DURATION 92 ms    Q-T INTERVAL 416 ms    QTC CALCULATION (BEZET) 386 ms    P Axis 41 degrees    R AXIS 75 degrees    T AXIS 19 degrees    MUSE DIAGNOSIS       Sinus bradycardia  Otherwise normal ECG  No previous ECGs available       Imaging    Xr Chest 2 Views    Result Date: 1/11/2020  EXAM: XR CHEST 2 VIEWS LOCATION: [facility] DATE/TIME: 1/11/2020 10:55 AM INDICATION: dizzy COMPARISON: None.     Heart size is normal. The lungs are clear. No pneumothorax or pleural effusion.      I personally reviewed x-rays and did not see any acute cardiopulmonary processes or pneumonias.    Assessment:     Procedures    The primary encounter diagnosis was Rectal bleeding. Diagnoses of Concern about digestive  cancer without diagnosis, Dizziness, Bradycardia, and Nausea were also pertinent to this visit.    Plan:     1. Rectal bleeding  HM1(CBC and Differential)    HM1 (CBC with Diff)    [facility] RED    Occult Blood(ICT)    Ambulatory referral to Gastroenterology   2. Concern about digestive cancer without diagnosis  Ambulatory referral to Gastroenterology   3. Dizziness  ISTAT CHEM 7 ([facility]IN[facility] ONLY)    Urinalysis-UC if Indicated    Pregnancy (Beta-hCG, Qual), Urine    XR Chest 2 Views    Electrocardiogram Perform - Clinic    Rapid Strep A Screen-Throat    Influenza A/B Rapid Test    Group A Strep, RNA Direct Detection, Throat    acetaminophen tablet 650 mg (TYLENOL)   4. Bradycardia     5. Nausea  ondansetron disintegrating tablet 4 mg (ZOFRAN-ODT)       I reviewed history, physical and treatment plan with my colleauge Dr. Morrow in the office today.    I believe that oral hydration is equivalent to IV hydration for symptomatic bradycardia.  Therefore, I did not send her to the emergency room for IV hydration although she may have had some slight symptomatic bradycardia with dizziness and a heart rate of 52 bpm.  Patient did not have any other findings on review of systems.  Her examination looked very well in the office.  She did not appear acutely ill toxic febrile dehydrated or confused.  Further, she did not have any signs or symptoms or risk factors for DVT or PE.     Multiple etiologies and diagnoses were considered to include, but not limited to, strep throat, influenza, metabolic imbalances or electrolyte abnormalities that were reviewed with the CHEM panel, additionally we looked at the creatinine with kidney function, EKG was checked looking for cardiac causes and there was bradycardia, central and peripheral causes of dizziness to include TIA stroke PE DVT labyrinthitis or BPPV, pregnancy nausea vomiting and gastroenteritis, UTI, pneumonia both typical and atypical, anemia.    Had a long conversation with the  patient stating that I did not think she had enough bleeding from the rectum with one bowel movement and no intermittent or continued bleeding that would cause her to have dizziness.    We talked for quite a long time regarding colon cancer and colon cancer risks and work-up for colon cancer.  She states that she just lost her grandmother who had colon cancer.  This was very upsetting to the patient.  She wants to have screening at age 40 and she is approaching that at age 34 according to the patient.  I explained to her the current recommendations for colonoscopy and the general slow growing nature of polyps.  However, we will focus on her issue with rectal bleeding.  Since she did have a positive Hemoccult we would then consider a rectal fissure as a possibility and most likely her cause of her symptoms.  However referral to GI for possible procotoscopy and sigmoidoscopy based on the patient's current current level of concern was written.    I reassured her that all the lab work-up was to normal results and that there were no concerning findings on work-up.  The only concerning finding was a positive Hemoccult and a dizziness with bradycardia.  Patient does have close follow-up with Dr. Jefferson on Monday which the patient will continue with that appointment.  If she becomes more symptomatic to include difficulty with steadiness concentration fatigue dizziness that worsens I would recommend that she proceed to the emergency room for definitive evaluation and treatment between today's appointment and follow-up with her primary care provider.  Patient was given Zofran in the office and it did help with her nauseousness.  She did not have any bouts of emesis in the office.  Additionally, she was observed taking 40 ounces of water from the 8:00 till noon time that she was in the office with the work-up.  Additionally we repeated vitals and although they did decrease with blood pressure and pulse they are still  stable.    Patient voiced understanding of above treatment plan and was discharged in good condition.        Patient Instructions         You had a complete blood count (CBC) that did not show anemia or blood loss.  Your fecal occult blood test that was done in the office and was positive for occult blood in the rectum.  You have no direct relative for colon cancer.  There is a family history of colon cancer.  Since you have rectal bleeding and a concern we will send you to the GI for consult for concern of rectal bleeding.  The most likely cause of a small amount of rectal bleeding is a rectal fissure when you pass a large formed stool.  This may or may not pertain to you.    Multiple other tests were run today in the office to include strep, flu, metabolic panel looking at electrolyte imbalances, your function of your internal organs to include your kidneys, chest x-ray, and EKG urine test and a urine pregnancy test.  All of the above labs were not indicating anything that was abnormal with the exception of a slow heartbeat.    I suggest you follow-up with your primary care provider on Monday, Dr. Jefferson as scheduled, otherwise returning to the emergency room over the weekend if you should have any lingering concerns or feel that new symptoms are changing or worsening.  Please refer to the handouts on bradycardia for indication to return to the emergency room over the weekend before seeing your family doctor.      Patient Education     Evaluating and Treating Rectal Bleeding     As part of your evaluation, a flexible sigmoidoscopy or colonoscopy may be done. You may also have an upper endoscopy if your stools are darker.   To find the site and cause of your bleeding, you will have a physical exam. You will be asked about your health history. Tests may be done to help confirm the diagnosis and plan your treatment.  Tests you may have  Any of these tests may be done:    Stool sample. A small amount of your stool will  be checked for blood.    Anoscopy. This test uses a small tube (anoscope) to examine the anus. It checks for problems such as hemorrhoids.    Sigmoidoscopy. This test uses a lighted tube to check your rectum and the part of the large intestine that is closest to the rectum (the sigmoid colon).    Colonoscopy. This test looks at your rectum and entire colon. You may be given medicine through an IV to help you relax.    Lower GI (gastrointestinal) series, or barium enema. This is an X-ray test to view your colon. A milky liquid containing barium is passed through your rectum and into the colon. This liquid makes it easy to see your colon on the X-ray.    Upper endoscopy. This test checks your esophagus, stomach, and upper small intestine. It's done in cases of rectal bleeding along with other symptoms like low blood pressure and rapid heartbeat. This test may also be done if your stools are dark black and tarry.    Capsule endoscopy. For this test, you swallow a pill that has a tiny camera inside. The camera takes pictures of your small intestine. It can get to areas that are hard to reach with colonoscopy and upper endoscopy.    Balloon enteroscopy. This test uses a special tube (scope) to get deep into the small intestine.    Tagged red blood cell scan. This test marks (tags) red blood cells with very small amounts of radioactive material. The cells can then be seen and tracked on a scan.    Angiography. This test threads a tube (catheter) through a vein, often in the leg. The tube injects dye into your blood vessels to see where the bleeding is taking place.  Your treatment plan  Your treatment will depend on the cause of your rectal bleeding. Your healthcare provider will create a treatment plan thats right for you. Sometimes rectal bleeding stops on its own. If it does, be sure to see your provider to check that the problem wasnt serious.  What you can do  Follow all your doctors instructions. Keep working with  your doctor after your treatment. Make and keep your follow-up visits. If you have more rectal bleeding, call your doctor. It may be a sign of the same or another health problem.  Date Last Reviewed: 7/1/2016 2000-2019 [employer]. [address] [place] [id number]. All rights reserved. This information is not intended as a substitute for professional medical care. Always follow your healthcare professional's instructions.           Patient Education     Understanding Rectal Bleeding    Rectal bleeding is when blood passes through your rectum and anus. It can happen with or without a bowel movement. Rectal bleeding may be a sign of a serious problem in your rectum, colon, or upper GI tract. Call your healthcare provider right away if you have any rectal bleeding.  The GI tract  The gastrointestinal (GI) tract includes the:    Mouth    Esophagus    Stomach    Small intestine    Large intestine (colon)    Rectum    Anus     The food you eat is digested as it passes through the GI tract. Solid waste leaves the body through the rectum.  Rectal bleeding and GI problems  The cause of rectal bleeding may be found in any part of the GI tract. The colon or rectum may be the site of your bleeding problem. Or bleeding may be due to problems farther up the GI tract, such as in the small intestine, duodenum, or stomach.  Causes of rectal bleeding  These are some possible causes:    Hemorrhoids (swollen veins in the rectum and anus)    Fissures (tears in or near the anus)    Diverticulosis (inflamed pockets in the colon wall)    Infection    Ischemia (low blood flow)    Radiation damage    Inflammatory bowel disease (Crohn's disease or ulcerative colitis)    Ulcers in the upper GI tract and inflammation of the large intestine    Abnormal tissue growths (tumors or polyps) in the GI tract    A bulging rectum (also called a rectal prolapse)    Abnormal blood vessels in the small intestine or in the  colon  Common symptoms  Common symptoms are:    Rectal pain, itching, or soreness    Belly pain, including upper belly pain near the stomach (epigastric pain)    Small drops of blood that sometimes appear on the stool or toilet paper    Stool that looks black or tarry   Rectal bleeding can also happen without pain.  Date Last Reviewed: 7/1/2016 2000-2019 [employer]. [address] [place] [address]. All rights reserved. This information is not intended as a substitute for professional medical care. Always follow your healthcare professional's instructions.           Patient Education     Bradycardia    When your heart rate is slow, less than 60 beats per minute, it is called bradycardia. Bradycardia can be normal, caused by medicines, or a sign of a disease. The slow heart rate may not be constant; it can come and go. It is a concern when it is very low, or you have symptoms.  Signs and symptoms  The following are signs and symptoms of bradycardia:    Heart rate less than 60 per minute    Dizziness or feeling lightheaded    Weakness    Trouble breathing    Fainting    Sleepiness    More trouble exercising than usual because of fatigue    Confusion or trouble concentrating  Causes  There are many causes of bradycardia. Some can be related to your heart, but some may be related to other factors.  Non-heart-related causes:    Advanced age    Side effect of certain medicines (such as beta-blockers, calcium channel blockers, digitalis, antiarrhythmic medicines like amiodarone, clonidine, lithium)    Medical conditions such as hypoglycemia (low blood sugar), hypothyroidism (low thyroid), electrolyte disorder,  hypothermia, sleep apnea    Athletes, especially long-distance runners, may have a slow heart rate. This can be normal.    Sleep apnea    Brain injury such as stroke or bleeding inside the brain  Heart-related causes:    Coronary artery disease (angina or prior heart attack, also known as  acute myocardial infarction, or AMI)    Heart valve disease    Heart muscle disease (cardiomyopathy)    Congestive heart failure    Sick sinus syndrome, which is when your heart's natural pacemaker is no longer working properly    Diseases that infiltrate the heart such as sarcoid    Heart infections  Sometimes the cause for the arrhythmia cannot be found.  Bradycardia that causes symptoms is sometimes reversible, and can be treated with medicines. When more severe bradycardia persists, a pacemaker is generally recommended. When the bradycardia does not cause symptoms, your doctor may decide to evaluate it in his or her office.  Home care  The following will help you care for yourself at home:    Resume your usual activities when you are feeling back to normal.    If you develop any of the symptoms below during exertion, then you should not exert yourself until evaluated further by your doctor.    Work with your doctor on any needed lifestyle changes, such as changing your diet, stopping smoking if you are a smoker, and a planned exercise program.  Follow-up care  Follow up with your doctor, or as advised.  Call 911  Call 911 if any of the following occur:    Chest pain    Trouble breathing    Slow heart rate with dizziness or lightheadedness    Fainting or loss of consciousness    Chest, shoulder, arm, neck, or back pain    Slow heart rate (under 50 beats per minute) if associated with symptoms  When to seek medical advice  Call your healthcare provider right away if any of the following occur:    Occasional weakness, dizziness, or lightheadedness  Date Last Reviewed: 4/25/2016 2000-2017 [employer]. [address] [place] [id number]. All rights reserved. This information is not intended as a substitute for professional medical care. Always follow your healthcare professional's instructions.           Patient Education     Understanding Bradycardia    Your heart has an electrical system  that sends signals to control the heartbeat. Any abnormal change in the speed or pattern of the heartbeat is called an arrhythmia. An arrhythmia that causes the heart to beat slower than normal is called bradycardia. There are many types of bradycardia. In healthy children and adults, bradycardia is often normal, particularly during sleep. Sometimes bradycardia is caused by failure of the hearts natural timer or failure of the electrical pathways within the heart. Depending on the type you have and how severe it is, you may need treatment.  What causes bradycardia?  Many things can cause bradycardia, including:     The natural aging  process    Coronary artery disease    Heart attacks    Heart muscle disease    Problems with the SA node. This is the hearts natural pacemaker that starts each heartbeat.    Problems with the electrical pathways in the heart    Problems with the structure of the heart that you are born with    Infection    Use of certain medicines    Electrolyte imbalances    Underactive thyroid     Sleep apnea    Increased pressure in the brain or stroke   Well-conditioned athletes often have a naturally slow heart rate.  What are the symptoms of bradycardia?  Bradycardia can cause an irregular heartbeat. It can also make it harder for the heart to pump blood to the body. This may cause symptoms such as:    Tiredness (fatigue)    Weakness    Loss of ability to exercise    Shortness of breath    Dizziness or fainting    Chest pain    Swelling in your legs and feet  Some people with bradycardia have no symptoms at all.  How is bradycardia treated?  Treatment for bradycardia depends on the cause. It also depends on the type you have and how severe your symptoms are. If you need treatment, your options may include:    Treatment of the underlying cause, if the cause can be fixed. For instance, if a medicine is causing bradycardia, stopping the medicine, under your doctors guidance, may correct the problem.  Or if a condition such as an underactive thyroid is the cause, treating the thyroid may keep bradycardia from coming back.    Medicines. Medicines may be used to treat conditions that cause bradycardia. Some medicines can also be used in the short-term to increase the heart rate. These are generally given with an IV (intravenous) and therefore are not long-term solutions.     Pacemaker. This is a device that is placed permanently under the skin (usually in your chest) and connected to your heart. The device monitors your heart, and when the heart beats too slowly, the device sends electrical impulses to keep the heart beating at the right pace.    Temporary pacemaker. A temporary pacemaker may be connected to the heart using wires guided through a blood vessel in your neck or leg to the heart. This is also sometimes done using special pads placed on the chest. This may be used in an emergency, as a bridge to permanent pacing, when pacing is only needed short-term, or to further evaluate your condition.  What are possible complications of bradycardia?  These can include:    Development of other types of arrhythmias    Heart failure. This problem occurs when the heart weakens so much that it no longer pumps blood well.    Sudden cardiac arrest. This is when the heart suddenly stops beating.  When should I call my healthcare provider?  Call your healthcare provider right away if you have any of these:    Symptoms that dont get better with treatment, or get worse    New symptoms  Date Last Reviewed: 5/1/2016 2000-2019 [email or web address]. [address] [place] [address], [place] [address]. All rights reserved. This information is not intended as a substitute for professional medical care. Always follow your healthcare professional's instructions.

## 2021-07-14 PROBLEM — O32.2XX0 TRANSVERSE LIE OF FETUS: Status: RESOLVED | Noted: 2019-02-13 | Resolved: 2019-04-04

## 2021-07-14 PROBLEM — Z98.891 STATUS POST REPEAT LOW TRANSVERSE CESAREAN SECTION: Status: RESOLVED | Noted: 2019-02-25 | Resolved: 2020-01-13

## 2021-07-14 PROBLEM — O28.3 ECHOGENIC INTRACARDIAC FOCUS OF FETUS ON PRENATAL ULTRASOUND: Status: RESOLVED | Noted: 2018-10-18 | Resolved: 2019-04-04

## 2021-07-14 PROBLEM — O32.2XX0 TRANSVERSE LIE OF FETUS: Status: RESOLVED | Noted: 2019-02-25 | Resolved: 2019-04-04

## 2021-07-14 PROBLEM — O34.219 PREVIOUS CESAREAN DELIVERY, ANTEPARTUM CONDITION OR COMPLICATION: Status: RESOLVED | Noted: 2018-07-30 | Resolved: 2019-04-04

## 2021-07-14 PROBLEM — Z34.90 PREGNANT: Status: RESOLVED | Noted: 2019-02-25 | Resolved: 2019-02-25

## 2021-10-16 ENCOUNTER — HEALTH MAINTENANCE LETTER (OUTPATIENT)
Age: 36
End: 2021-10-16

## 2022-02-17 PROBLEM — O09.92 SUPERVISION OF HIGH RISK PREGNANCY IN SECOND TRIMESTER: Status: RESOLVED | Noted: 2018-07-30 | Resolved: 2019-04-04

## 2022-07-23 ENCOUNTER — HEALTH MAINTENANCE LETTER (OUTPATIENT)
Age: 37
End: 2022-07-23

## 2022-10-01 ENCOUNTER — HEALTH MAINTENANCE LETTER (OUTPATIENT)
Age: 37
End: 2022-10-01

## 2023-08-06 ENCOUNTER — HEALTH MAINTENANCE LETTER (OUTPATIENT)
Age: 38
End: 2023-08-06